# Patient Record
Sex: MALE | Race: WHITE | NOT HISPANIC OR LATINO | Employment: UNEMPLOYED | URBAN - METROPOLITAN AREA
[De-identification: names, ages, dates, MRNs, and addresses within clinical notes are randomized per-mention and may not be internally consistent; named-entity substitution may affect disease eponyms.]

---

## 2017-01-01 ENCOUNTER — APPOINTMENT (OUTPATIENT)
Dept: GENERAL RADIOLOGY | Facility: HOSPITAL | Age: 62
End: 2017-01-01

## 2017-01-01 ENCOUNTER — APPOINTMENT (OUTPATIENT)
Dept: CT IMAGING | Facility: HOSPITAL | Age: 62
End: 2017-01-01

## 2017-01-01 ENCOUNTER — HOSPITAL ENCOUNTER (EMERGENCY)
Facility: HOSPITAL | Age: 62
Discharge: HOME | End: 2017-04-22
Attending: EMERGENCY MEDICINE | Admitting: EMERGENCY MEDICINE

## 2017-01-01 VITALS
HEART RATE: 80 BPM | TEMPERATURE: 99.3 F | DIASTOLIC BLOOD PRESSURE: 87 MMHG | SYSTOLIC BLOOD PRESSURE: 119 MMHG | RESPIRATION RATE: 16 BRPM | OXYGEN SATURATION: 94 %

## 2017-01-01 DIAGNOSIS — S60.222A CONTUSION OF LEFT HAND, INITIAL ENCOUNTER: ICD-10-CM

## 2017-01-01 DIAGNOSIS — S00.532A CONTUSION OF MOUTH: ICD-10-CM

## 2017-01-01 DIAGNOSIS — S00.83XA CONTUSION OF FACE, INITIAL ENCOUNTER: ICD-10-CM

## 2017-01-01 DIAGNOSIS — S51.012A LACERATION OF LEFT ELBOW, INITIAL ENCOUNTER: ICD-10-CM

## 2017-01-01 DIAGNOSIS — S62.339A BOXER'S FRACTURE, CLOSED, INITIAL ENCOUNTER: Primary | ICD-10-CM

## 2017-01-01 DIAGNOSIS — S60.221A CONTUSION OF RIGHT HAND, INITIAL ENCOUNTER: ICD-10-CM

## 2017-01-01 PROCEDURE — 70450 CT HEAD/BRAIN W/O DYE: CPT

## 2017-01-01 PROCEDURE — 70486 CT MAXILLOFACIAL W/O DYE: CPT

## 2017-01-01 PROCEDURE — 29105 APPLICATION LONG ARM SPLINT: CPT | Performed by: EMERGENCY MEDICINE

## 2017-01-01 PROCEDURE — 99284 EMERGENCY DEPT VISIT MOD MDM: CPT

## 2017-01-01 PROCEDURE — 73130 X-RAY EXAM OF HAND: CPT

## 2017-01-01 PROCEDURE — 12032 INTMD RPR S/A/T/EXT 2.6-7.5: CPT | Performed by: EMERGENCY MEDICINE

## 2017-01-01 PROCEDURE — 99285 EMERGENCY DEPT VISIT HI MDM: CPT | Performed by: EMERGENCY MEDICINE

## 2017-01-01 PROCEDURE — 73080 X-RAY EXAM OF ELBOW: CPT

## 2017-01-01 RX ORDER — OXYCODONE HYDROCHLORIDE AND ACETAMINOPHEN 5; 325 MG/1; MG/1
1 TABLET ORAL EVERY 6 HOURS PRN
Qty: 30 TABLET | Refills: 0 | Status: SHIPPED | OUTPATIENT
Start: 2017-01-01

## 2017-01-01 RX ORDER — OXYCODONE HYDROCHLORIDE AND ACETAMINOPHEN 5; 325 MG/1; MG/1
2 TABLET ORAL ONCE
Status: COMPLETED | OUTPATIENT
Start: 2017-01-01 | End: 2017-01-01

## 2017-01-01 RX ORDER — BUPIVACAINE HYDROCHLORIDE 5 MG/ML
10 INJECTION, SOLUTION EPIDURAL; INTRACAUDAL ONCE
Status: COMPLETED | OUTPATIENT
Start: 2017-01-01 | End: 2017-01-01

## 2017-01-01 RX ORDER — LIDOCAINE HYDROCHLORIDE AND EPINEPHRINE 10; 10 MG/ML; UG/ML
10 INJECTION, SOLUTION INFILTRATION; PERINEURAL ONCE
Status: COMPLETED | OUTPATIENT
Start: 2017-01-01 | End: 2017-01-01

## 2017-01-01 RX ADMIN — OXYCODONE HYDROCHLORIDE AND ACETAMINOPHEN 2 TABLET: 5; 325 TABLET ORAL at 01:17

## 2017-01-01 RX ADMIN — BUPIVACAINE HYDROCHLORIDE 10 ML: 5 INJECTION, SOLUTION EPIDURAL; INTRACAUDAL; PERINEURAL at 01:04

## 2017-01-01 RX ADMIN — LIDOCAINE HYDROCHLORIDE AND EPINEPHRINE 10 ML: 10; 10 INJECTION, SOLUTION INFILTRATION; PERINEURAL at 01:04

## 2017-01-03 ENCOUNTER — HOSPITAL ENCOUNTER (INPATIENT)
Facility: HOSPITAL | Age: 62
LOS: 3 days | Discharge: HOME | End: 2017-01-06
Attending: EMERGENCY MEDICINE | Admitting: INTERNAL MEDICINE

## 2017-01-03 ENCOUNTER — CLINICAL SUPPORT NO REQUIREMENTS (OUTPATIENT)
Dept: CARDIOLOGY | Facility: CLINIC | Age: 62
End: 2017-01-03

## 2017-01-03 ENCOUNTER — APPOINTMENT (OUTPATIENT)
Dept: CT IMAGING | Facility: HOSPITAL | Age: 62
End: 2017-01-03

## 2017-01-03 ENCOUNTER — APPOINTMENT (OUTPATIENT)
Dept: GENERAL RADIOLOGY | Facility: HOSPITAL | Age: 62
End: 2017-01-03

## 2017-01-03 DIAGNOSIS — R07.9 CHEST PAIN IN ADULT: Primary | ICD-10-CM

## 2017-01-03 DIAGNOSIS — J18.9 PNEUMONIA OF RIGHT LOWER LOBE DUE TO INFECTIOUS ORGANISM: ICD-10-CM

## 2017-01-03 DIAGNOSIS — I25.5 ISCHEMIC CARDIOMYOPATHY: Primary | ICD-10-CM

## 2017-01-03 PROBLEM — R07.89 ATYPICAL CHEST PAIN: Status: ACTIVE | Noted: 2017-01-03

## 2017-01-03 LAB
ALBUMIN SERPL-MCNC: 3.6 G/DL (ref 3.5–5.2)
ALBUMIN/GLOB SERPL: 1.1 G/DL
ALP SERPL-CCNC: 90 U/L (ref 40–129)
ALT SERPL W P-5'-P-CCNC: 16 U/L (ref 5–41)
ANION GAP SERPL CALCULATED.3IONS-SCNC: 16.6 MMOL/L
AST SERPL-CCNC: 15 U/L (ref 5–40)
B PERT DNA SPEC QL NAA+PROBE: NOT DETECTED
BASOPHILS # BLD AUTO: 0.11 10*3/MM3 (ref 0–0.2)
BASOPHILS NFR BLD AUTO: 1.1 % (ref 0–2)
BILIRUB SERPL-MCNC: 0.7 MG/DL (ref 0.2–1.2)
BUN BLD-MCNC: 17 MG/DL (ref 8–23)
BUN/CREAT SERPL: 14.3 (ref 7–25)
C PNEUM DNA NPH QL NAA+NON-PROBE: NOT DETECTED
CALCIUM SPEC-SCNC: 9.2 MG/DL (ref 8.8–10.5)
CHLORIDE SERPL-SCNC: 98 MMOL/L (ref 98–107)
CO2 SERPL-SCNC: 21.4 MMOL/L (ref 22–29)
CREAT BLD-MCNC: 1.19 MG/DL (ref 0.76–1.27)
D DIMER PPP FEU-MCNC: 0.47 MCGFEU/ML (ref 0–0.46)
D-LACTATE SERPL-SCNC: 1 MMOL/L (ref 0.5–2)
DEPRECATED RDW RBC AUTO: 49.4 FL (ref 37–54)
EOSINOPHIL # BLD AUTO: 0.24 10*3/MM3 (ref 0.1–0.3)
EOSINOPHIL NFR BLD AUTO: 2.3 % (ref 0–4)
ERYTHROCYTE [DISTWIDTH] IN BLOOD BY AUTOMATED COUNT: 15.4 % (ref 11.5–14.5)
FLUAV H1 2009 PAND RNA NPH QL NAA+PROBE: NOT DETECTED
FLUAV H1 HA GENE NPH QL NAA+PROBE: NOT DETECTED
FLUAV H3 RNA NPH QL NAA+PROBE: NOT DETECTED
FLUAV SUBTYP SPEC NAA+PROBE: NOT DETECTED
FLUBV RNA ISLT QL NAA+PROBE: NOT DETECTED
GFR SERPL CREATININE-BSD FRML MDRD: 62 ML/MIN/1.73
GLOBULIN UR ELPH-MCNC: 3.3 GM/DL
GLUCOSE BLD-MCNC: 99 MG/DL (ref 65–99)
HADV DNA SPEC NAA+PROBE: NOT DETECTED
HCOV 229E RNA SPEC QL NAA+PROBE: NOT DETECTED
HCOV HKU1 RNA SPEC QL NAA+PROBE: NOT DETECTED
HCOV NL63 RNA SPEC QL NAA+PROBE: NOT DETECTED
HCOV OC43 RNA SPEC QL NAA+PROBE: NOT DETECTED
HCT VFR BLD AUTO: 40 % (ref 42–52)
HGB BLD-MCNC: 13.2 G/DL (ref 14–18)
HMPV RNA NPH QL NAA+NON-PROBE: NOT DETECTED
HPIV1 RNA SPEC QL NAA+PROBE: NOT DETECTED
HPIV2 RNA SPEC QL NAA+PROBE: NOT DETECTED
HPIV3 RNA NPH QL NAA+PROBE: NOT DETECTED
HPIV4 P GENE NPH QL NAA+PROBE: NOT DETECTED
IMM GRANULOCYTES # BLD: 0.03 10*3/MM3 (ref 0–0.03)
IMM GRANULOCYTES NFR BLD: 0.3 % (ref 0–0.5)
INR PPP: 1.12 (ref 0.9–1.1)
LYMPHOCYTES # BLD AUTO: 1.16 10*3/MM3 (ref 0.6–4.8)
LYMPHOCYTES NFR BLD AUTO: 11.3 % (ref 20–45)
M PNEUMO IGG SER IA-ACNC: NOT DETECTED
MCH RBC QN AUTO: 29 PG (ref 27–31)
MCHC RBC AUTO-ENTMCNC: 33 G/DL (ref 31–37)
MCV RBC AUTO: 87.9 FL (ref 80–94)
MONOCYTES # BLD AUTO: 0.66 10*3/MM3 (ref 0–1)
MONOCYTES NFR BLD AUTO: 6.4 % (ref 3–8)
NEUTROPHILS # BLD AUTO: 8.11 10*3/MM3 (ref 1.5–8.3)
NEUTROPHILS NFR BLD AUTO: 78.6 % (ref 45–70)
NRBC BLD MANUAL-RTO: 0 /100 WBC (ref 0–0)
PLATELET # BLD AUTO: 234 10*3/MM3 (ref 140–500)
PMV BLD AUTO: 11.5 FL (ref 7.4–10.4)
POTASSIUM BLD-SCNC: 4 MMOL/L (ref 3.5–5.2)
PROT SERPL-MCNC: 6.9 G/DL (ref 6–8.5)
PROTHROMBIN TIME: 14.1 SECONDS (ref 12.1–15)
RBC # BLD AUTO: 4.55 10*6/MM3 (ref 4.7–6.1)
RHINOVIRUS RNA SPEC NAA+PROBE: NOT DETECTED
RSV RNA NPH QL NAA+NON-PROBE: NOT DETECTED
SODIUM BLD-SCNC: 136 MMOL/L (ref 136–145)
TROPONIN T SERPL-MCNC: 0.01 NG/ML (ref 0–0.03)
TROPONIN T SERPL-MCNC: <0.01 NG/ML (ref 0–0.03)
TROPONIN T SERPL-MCNC: <0.01 NG/ML (ref 0–0.03)
WBC NRBC COR # BLD: 10.31 10*3/MM3 (ref 4.8–10.8)

## 2017-01-03 PROCEDURE — 25010000002 CEFTRIAXONE PER 250 MG: Performed by: EMERGENCY MEDICINE

## 2017-01-03 PROCEDURE — 85610 PROTHROMBIN TIME: CPT | Performed by: EMERGENCY MEDICINE

## 2017-01-03 PROCEDURE — 87486 CHLMYD PNEUM DNA AMP PROBE: CPT | Performed by: INTERNAL MEDICINE

## 2017-01-03 PROCEDURE — 99222 1ST HOSP IP/OBS MODERATE 55: CPT | Performed by: INTERNAL MEDICINE

## 2017-01-03 PROCEDURE — 87633 RESP VIRUS 12-25 TARGETS: CPT | Performed by: INTERNAL MEDICINE

## 2017-01-03 PROCEDURE — 25010000002 AZITHROMYCIN: Performed by: EMERGENCY MEDICINE

## 2017-01-03 PROCEDURE — 87581 M.PNEUMON DNA AMP PROBE: CPT | Performed by: INTERNAL MEDICINE

## 2017-01-03 PROCEDURE — 85025 COMPLETE CBC W/AUTO DIFF WBC: CPT | Performed by: EMERGENCY MEDICINE

## 2017-01-03 PROCEDURE — 85379 FIBRIN DEGRADATION QUANT: CPT | Performed by: EMERGENCY MEDICINE

## 2017-01-03 PROCEDURE — 99284 EMERGENCY DEPT VISIT MOD MDM: CPT

## 2017-01-03 PROCEDURE — 71250 CT THORAX DX C-: CPT

## 2017-01-03 PROCEDURE — 71020 HC CHEST PA AND LATERAL: CPT

## 2017-01-03 PROCEDURE — 93282 PRGRMG EVAL IMPLANTABLE DFB: CPT | Performed by: INTERNAL MEDICINE

## 2017-01-03 PROCEDURE — 80053 COMPREHEN METABOLIC PANEL: CPT | Performed by: EMERGENCY MEDICINE

## 2017-01-03 PROCEDURE — 94799 UNLISTED PULMONARY SVC/PX: CPT

## 2017-01-03 PROCEDURE — 25010000002 ENOXAPARIN PER 10 MG: Performed by: INTERNAL MEDICINE

## 2017-01-03 PROCEDURE — 87205 SMEAR GRAM STAIN: CPT | Performed by: INTERNAL MEDICINE

## 2017-01-03 PROCEDURE — 25010000002 KETOROLAC TROMETHAMINE PER 15 MG: Performed by: EMERGENCY MEDICINE

## 2017-01-03 PROCEDURE — 84484 ASSAY OF TROPONIN QUANT: CPT | Performed by: EMERGENCY MEDICINE

## 2017-01-03 PROCEDURE — 87798 DETECT AGENT NOS DNA AMP: CPT | Performed by: INTERNAL MEDICINE

## 2017-01-03 PROCEDURE — 99285 EMERGENCY DEPT VISIT HI MDM: CPT | Performed by: EMERGENCY MEDICINE

## 2017-01-03 PROCEDURE — 94640 AIRWAY INHALATION TREATMENT: CPT

## 2017-01-03 PROCEDURE — 84484 ASSAY OF TROPONIN QUANT: CPT | Performed by: INTERNAL MEDICINE

## 2017-01-03 PROCEDURE — 87070 CULTURE OTHR SPECIMN AEROBIC: CPT | Performed by: INTERNAL MEDICINE

## 2017-01-03 PROCEDURE — 83605 ASSAY OF LACTIC ACID: CPT | Performed by: INTERNAL MEDICINE

## 2017-01-03 PROCEDURE — 93010 ELECTROCARDIOGRAM REPORT: CPT | Performed by: INTERNAL MEDICINE

## 2017-01-03 PROCEDURE — 93290 INTERROG DEV EVAL ICPMS IP: CPT | Performed by: INTERNAL MEDICINE

## 2017-01-03 PROCEDURE — 87040 BLOOD CULTURE FOR BACTERIA: CPT | Performed by: EMERGENCY MEDICINE

## 2017-01-03 PROCEDURE — 93005 ELECTROCARDIOGRAM TRACING: CPT | Performed by: EMERGENCY MEDICINE

## 2017-01-03 RX ORDER — ACETAMINOPHEN 325 MG/1
650 TABLET ORAL EVERY 6 HOURS PRN
Status: DISCONTINUED | OUTPATIENT
Start: 2017-01-03 | End: 2017-01-06 | Stop reason: HOSPADM

## 2017-01-03 RX ORDER — FUROSEMIDE 40 MG/1
40 TABLET ORAL DAILY
COMMUNITY

## 2017-01-03 RX ORDER — ALBUTEROL SULFATE 90 UG/1
2 AEROSOL, METERED RESPIRATORY (INHALATION) EVERY 4 HOURS PRN
COMMUNITY

## 2017-01-03 RX ORDER — SIMVASTATIN 40 MG
40 TABLET ORAL NIGHTLY
COMMUNITY

## 2017-01-03 RX ORDER — ACETAMINOPHEN 325 MG/1
650 TABLET ORAL EVERY 6 HOURS PRN
COMMUNITY

## 2017-01-03 RX ORDER — CLOPIDOGREL BISULFATE 75 MG/1
75 TABLET ORAL DAILY
COMMUNITY

## 2017-01-03 RX ORDER — IPRATROPIUM BROMIDE AND ALBUTEROL SULFATE 2.5; .5 MG/3ML; MG/3ML
3 SOLUTION RESPIRATORY (INHALATION)
Status: DISCONTINUED | OUTPATIENT
Start: 2017-01-03 | End: 2017-01-06 | Stop reason: HOSPADM

## 2017-01-03 RX ORDER — SODIUM CHLORIDE 0.9 % (FLUSH) 0.9 %
10 SYRINGE (ML) INJECTION AS NEEDED
Status: DISCONTINUED | OUTPATIENT
Start: 2017-01-03 | End: 2017-01-06 | Stop reason: HOSPADM

## 2017-01-03 RX ORDER — ASPIRIN 81 MG/1
81 TABLET ORAL DAILY
Status: DISCONTINUED | OUTPATIENT
Start: 2017-01-03 | End: 2017-01-06 | Stop reason: HOSPADM

## 2017-01-03 RX ORDER — ATORVASTATIN CALCIUM 20 MG/1
20 TABLET, FILM COATED ORAL NIGHTLY
Status: DISCONTINUED | OUTPATIENT
Start: 2017-01-03 | End: 2017-01-06 | Stop reason: HOSPADM

## 2017-01-03 RX ORDER — DOCUSATE SODIUM 100 MG/1
100 CAPSULE, LIQUID FILLED ORAL 2 TIMES DAILY
Status: DISCONTINUED | OUTPATIENT
Start: 2017-01-03 | End: 2017-01-06 | Stop reason: HOSPADM

## 2017-01-03 RX ORDER — ASPIRIN 81 MG/1
81 TABLET ORAL DAILY
COMMUNITY

## 2017-01-03 RX ORDER — CLOPIDOGREL BISULFATE 75 MG/1
75 TABLET ORAL DAILY
Status: DISCONTINUED | OUTPATIENT
Start: 2017-01-03 | End: 2017-01-06 | Stop reason: HOSPADM

## 2017-01-03 RX ORDER — NITROGLYCERIN 0.4 MG/1
0.4 TABLET SUBLINGUAL
Status: DISCONTINUED | OUTPATIENT
Start: 2017-01-03 | End: 2017-01-06 | Stop reason: HOSPADM

## 2017-01-03 RX ORDER — KETOROLAC TROMETHAMINE 30 MG/ML
30 INJECTION, SOLUTION INTRAMUSCULAR; INTRAVENOUS ONCE
Status: COMPLETED | OUTPATIENT
Start: 2017-01-03 | End: 2017-01-03

## 2017-01-03 RX ORDER — NITROGLYCERIN 0.4 MG/1
0.4 TABLET SUBLINGUAL
COMMUNITY

## 2017-01-03 RX ORDER — SODIUM CHLORIDE 9 MG/ML
125 INJECTION, SOLUTION INTRAVENOUS CONTINUOUS
Status: DISCONTINUED | OUTPATIENT
Start: 2017-01-03 | End: 2017-01-05

## 2017-01-03 RX ADMIN — SODIUM CHLORIDE 1000 ML: 9 INJECTION, SOLUTION INTRAVENOUS at 07:40

## 2017-01-03 RX ADMIN — SODIUM CHLORIDE 125 ML/HR: 9 INJECTION, SOLUTION INTRAVENOUS at 10:20

## 2017-01-03 RX ADMIN — IPRATROPIUM BROMIDE AND ALBUTEROL SULFATE 3 ML: .5; 3 SOLUTION RESPIRATORY (INHALATION) at 20:36

## 2017-01-03 RX ADMIN — ASPIRIN 81 MG: 81 TABLET, COATED ORAL at 12:28

## 2017-01-03 RX ADMIN — CLOPIDOGREL BISULFATE 75 MG: 75 TABLET, FILM COATED ORAL at 10:30

## 2017-01-03 RX ADMIN — ACETAMINOPHEN 650 MG: 325 TABLET, FILM COATED ORAL at 21:34

## 2017-01-03 RX ADMIN — ENOXAPARIN SODIUM 40 MG: 40 INJECTION SUBCUTANEOUS at 20:50

## 2017-01-03 RX ADMIN — KETOROLAC TROMETHAMINE 30 MG: 30 INJECTION, SOLUTION INTRAMUSCULAR at 00:33

## 2017-01-03 RX ADMIN — IPRATROPIUM BROMIDE AND ALBUTEROL SULFATE 3 ML: .5; 3 SOLUTION RESPIRATORY (INHALATION) at 12:37

## 2017-01-03 RX ADMIN — AZITHROMYCIN 500 MG: 500 INJECTION, POWDER, LYOPHILIZED, FOR SOLUTION INTRAVENOUS at 06:15

## 2017-01-03 RX ADMIN — CEFTRIAXONE 1 G: 1 INJECTION, SOLUTION INTRAVENOUS at 05:32

## 2017-01-03 RX ADMIN — SODIUM CHLORIDE 125 ML/HR: 9 INJECTION, SOLUTION INTRAVENOUS at 18:31

## 2017-01-03 RX ADMIN — ATORVASTATIN CALCIUM 20 MG: 20 TABLET, FILM COATED ORAL at 20:49

## 2017-01-03 RX ADMIN — IPRATROPIUM BROMIDE AND ALBUTEROL SULFATE 3 ML: .5; 3 SOLUTION RESPIRATORY (INHALATION) at 09:09

## 2017-01-03 RX ADMIN — IPRATROPIUM BROMIDE AND ALBUTEROL SULFATE 3 ML: .5; 3 SOLUTION RESPIRATORY (INHALATION) at 16:32

## 2017-01-03 NOTE — H&P
HOSPITALIST SERVICES       @ [place], [place]                Hospitalist Team    ADMISSION HISTORY AND PHYSICAL    PATIENT:      Patient Care Team:  No Known Provider as PCP - General    CHIEF COMPLAINT:     Lt sided chest pain since yesterday, not associated with ambulation or physical activity    HISTORY OF PRESENT ILLNESS:    Mr. Mario Nicholson is a 61 year old  male who is known to have HTN, Hyperlipidemia, CAD/MI, S/P CABG and Status quo cardiac defibrillator, who presented to ED via ambulance for evaluation of new onset chest pains. He has a hx of CAD with an MI in 2016 that required stenting and subsequent defibrillator implantation. These procedures were done in [place], [place]. Unfortunately, pt was placed under arrest about one month ago and was recently transferred to a local senior living in our area ([facility]). He has been taking his usual daily heart-related medications throughout his imprisonment, however he says that prior to his transfer to this new senior living, he was started briefly on someone unknown antibiotic for a pneumonia but never got to finish that prescription here. Last night, as he was lying in his bunk, he suddenly developed some new left sided chest pains that began to worry him. He says the pain is worse with certain positions and is also worse with deep breaths. He has had some moderate productive cough with yellow sputum for several weeks. He also says that he does not smoke now but smoked for several years before recently quitting. He denies any recent fevers. He denies any hx of abdominal pain, nausea, vomiting, dizziness, or sweating. He denies any sx of dysuria or recent hx of blood in stool    Thorough analysis of the patient’s complaint using the FAR COLDER system:   Frequency Yesterday evening  Associated symptoms NONE  Radiations NONE   Character stabbing pain   Onset Yesterday evening   Location Left chest  Duration for several minutes  Exacerbating factors  Movement and deep breathing  Relieving factors Rest      HEART Score  History: Moderately suspicious (+1)  ECG: Normal (+0)  Age: Greater than or equal to 65 (+2)  Risk Factors: 3 or more risk factors OR history of atherosclerotic disease (+2)  Troponin: Normal limit or lower (+0)  Total: 5                  Past Medical History   Diagnosis Date   • COPD (chronic obstructive pulmonary disease)    • Coronary artery disease    • Hyperlipidemia    • Hypertension      Past Surgical History   Procedure Laterality Date   • Coronary artery bypass graft     • Cardiac defibrillator placement       History reviewed. No pertinent family history.  Social History   Substance Use Topics   • Smoking status: Former Smoker     Packs/day: 2.00     Years: 55.00   • Smokeless tobacco: None   • Alcohol use No     Prescriptions Prior to Admission   Medication Sig Dispense Refill Last Dose   • acetaminophen (TYLENOL) 325 MG tablet Take 650 mg by mouth Every 6 (Six) Hours As Needed for mild pain (1-3).      • nitroglycerin (NITROSTAT) 0.4 MG SL tablet Place 0.4 mg under the tongue Every 5 (Five) Minutes As Needed for chest pain. Take no more than 3 doses in 15 minutes.      • albuterol (PROVENTIL HFA;VENTOLIN HFA) 108 (90 BASE) MCG/ACT inhaler Inhale 2 puffs Every 4 (Four) Hours As Needed for wheezing.      • aspirin 81 MG EC tablet Take 81 mg by mouth Daily.      • clopidogrel (PLAVIX) 75 MG tablet Take 75 mg by mouth Daily.      • furosemide (LASIX) 40 MG tablet Take 40 mg by mouth Daily.      • simvastatin (ZOCOR) 40 MG tablet Take 40 mg by mouth Every Night.        Allergies:  Contrast dye    REVIEW OF SYSTEMS:  Please see the above history of present illness for pertinent positives and negatives.  The remainder of the patient's systems have been reviewed and are negative.     Vital Signs  Temp:  [97 °F (36.1 °C)-97.6 °F (36.4 °C)] 97 °F (36.1 °C)  Heart Rate:  [65-86] 79  Resp:  [20] 20  BP: ()/(49-89) 106/73    Flowsheet Rows   "       First Filed Value    Admission Height  71\" (180.3 cm) Documented at 01/03/2017 0059    Admission Weight  177 lb (80.3 kg) Documented at 01/03/2017 0059           Physical Exam:    PHYSICAL EXAMINATION:    VITAL SIGNS: As per Nurse's notes    GENERAL APPEARANCE: The patient is a well developed, well nourished,  male, in no acute distress without complaints of shortness of breath, dyspnea or acute pain. Lips and nail beds are pink.    HEENT: Normocephalic, atraumatic. PERRL. The sclerae anicteric and conjunctivae pink and moist.  No rhinitis. Lips, teeth, and gums showed normal mucosa. The oral mucosa, hard and soft palate, tongue and posterior pharynx were normal.     NECK: Supple and symmetric. No masses. No thyromegaly. No tenderness. Trachea central. No carotid bruits. No evidence of JVD.    LYMPH NODES: No palpable lymphadenopathy.    SKIN: Warm, dry and intact. No rash or lesions or wounds or patechiae.    CHEST: Normal AP diameter and normal contour without any kyphoscoliosis. Patient has pain in left rib cage laterally on palpation.    LUNGS: Accessory muscles of respiration are not active. Clear to auscultation bilaterally. Respiratory expansion equal bilaterally. No wheezes/rales/crackles/rubs.    CARDIOVASCULAR: RRR. S1, S2 normal without murmur/gallop/rub. No S3, S4. The carotid pulses were normal and 2+ bilaterally without bruits. Peripheral pulses were 2+ and symmetric. Capillary refill less than 3 seconds.    ABDOMEN: Soft, non-tender, non-distended. No masses. No rebound/guarding. No hepatosplenomegaly.     RECTAL: Not done for today's visit.     EXTREMITIES:  No evidence of cyanosis, clubbing, or edema. No rash or lesions. + pedal pulses. No ulcers or varicosities identified. Pulses are 2+ throughout. No evidence of Pallor, Pulselessness, Poikilothermia (\"coldness\"), Paralysis or Paresthesia. Moves all extremities well. Negative Homans sign bilaterally.     MUSCULOSKELETAL: Examination " of cervical and lumbosacral spines unremarkable. ROM of extremities WNL. No evidence of redness, swelling, warmth or pain of the joints of the extremities. Muscle strength and tone normal.    PSYCHIATRY: Responds appropriately to questions. No evidence of acute anxiety, depression, panic attacks or hallucinations.    NEUROLOGIC: Examination is grossly intact globally with no focal deficits. Cranial nerves II through XII are grossly intact. No motor weakness. No decrease in sensation. No facial asymmetry. Gait not tested.             Results Review:    I reviewed the patient's new clinical results.  Lab Results (most recent)     Procedure Component Value Units Date/Time    CBC & Differential [[id number]] Collected:  01/03/17 0029    Specimen:  Blood Updated:  01/03/17 0042    Narrative:       The following orders were created for panel order CBC & Differential.  Procedure                               Abnormality         Status                     ---------                               -----------         ------                     CBC Auto Differential[[id number]]         Abnormal            Final result                 Please view results for these tests on the individual orders.    CBC Auto Differential [[id number]]  (Abnormal) Collected:  01/03/17 0029    Specimen:  Blood Updated:  01/03/17 0042     WBC 10.31 10*3/mm3      RBC 4.55 (L) 10*6/mm3      Hemoglobin 13.2 (L) g/dL      Hematocrit 40.0 (L) %      MCV 87.9 fL      MCH 29.0 pg      MCHC 33.0 g/dL      RDW 15.4 (H) %      RDW-SD 49.4 fl      MPV 11.5 (H) fL      Platelets 234 10*3/mm3      Neutrophil % 78.6 (H) %      Lymphocyte % 11.3 (L) %      Monocyte % 6.4 %      Eosinophil % 2.3 %      Basophil % 1.1 %      Immature Grans % 0.3 %      Neutrophils, Absolute 8.11 10*3/mm3      Lymphocytes, Absolute 1.16 10*3/mm3      Monocytes, Absolute 0.66 10*3/mm3      Eosinophils, Absolute 0.24 10*3/mm3      Basophils, Absolute 0.11 10*3/mm3      Immature Grans, Absolute  0.03 10*3/mm3      nRBC 0.0 /100 WBC     Protime-INR [[id number]]  (Abnormal) Collected:  01/03/17 0029    Specimen:  Blood Updated:  01/03/17 0058     Protime 14.1 Seconds      INR 1.12 (H)     Narrative:       Therapeutic Ranges for INR: 2.0-3.0 (PT 20-30)                              2.5-3.5 (PT 25-34)    Troponin [[id number]]  (Normal) Collected:  01/03/17 0029    Specimen:  Blood Updated:  01/03/17 0106     Troponin T 0.012 ng/mL     Narrative:       Troponin T Reference Ranges:  Less than 0.03 ng/mL:    Negative for AMI  0.03 to 0.09 ng/mL:      Indeterminant for AMI  Greater than 0.09 ng/mL: Positive for AMI    Comprehensive Metabolic Panel [[id number]]  (Abnormal) Collected:  01/03/17 0029    Specimen:  Blood Updated:  01/03/17 0112     Glucose 99 mg/dL      BUN 17 mg/dL      Creatinine 1.19 mg/dL      Sodium 136 mmol/L      Potassium 4.0 mmol/L      Chloride 98 mmol/L      CO2 21.4 (L) mmol/L      Calcium 9.2 mg/dL      Total Protein 6.9 g/dL      Albumin 3.60 g/dL      ALT (SGPT) 16 U/L      AST (SGOT) 15 U/L      Alkaline Phosphatase 90 U/L      Total Bilirubin 0.7 mg/dL      eGFR Non African Amer 62 mL/min/1.73      Globulin 3.3 gm/dL      A/G Ratio 1.1 g/dL      BUN/Creatinine Ratio 14.3      Anion Gap 16.6 mmol/L     Blood Culture [[id number]] Collected:  01/03/17 0533    Specimen:  Blood from Arm, Right Updated:  01/03/17 0534    Blood Culture [[id number]] Collected:  01/03/17 0533    Specimen:  Blood from Arm, Left Updated:  01/03/17 0534    D-dimer, Quantitative [[id number]]  (Abnormal) Collected:  01/03/17 0030    Specimen:  Blood Updated:  01/03/17 0549     D-Dimer, Quantitative 0.47 (H) MCGFEU/mL     Narrative:       Can be elevated in, but is not diagnostic for deep vein thrombosis (DVT) or pulmonary embolis (PE).  It is also elevated in other medical conditions.  Clinical correlation is required.  The negative cut-off value for the D-Dimer is 0.50 mcg FEU/mL for DVT and PE.    Troponin [[id number]]  (Normal)  Collected:  01/03/17 0533    Specimen:  Blood Updated:  01/03/17 0610     Troponin T <0.010 ng/mL     Narrative:       Troponin T Reference Ranges:  Less than 0.03 ng/mL:    Negative for AMI  0.03 to 0.09 ng/mL:      Indeterminant for AMI  Greater than 0.09 ng/mL: Positive for AMI    Lactic Acid, Plasma [[id number]]  (Normal) Collected:  01/03/17 0820    Specimen:  Blood Updated:  01/03/17 0850     Lactate 1.0 mmol/L           Imaging Results (most recent)     Procedure Component Value Units Date/Time    CT Chest Without Contrast [[id number]] Collected:  01/03/17 0811     Updated:  01/03/17 0832    Narrative:       CT CHEST WITHOUT CONTRAST 01/03/2017 AT 0324 HOURS     HISTORY: 61-year-old male with complaints of left-sided chest pain  tonight worse with deep inspiration. Productive cough for 2 days.  Emphysema. Previous myocardial infarction.     COMPARISON: PA and lateral chest radiograph 01/03/2017 at 0050 hours.     PROCEDURE: 5 mm noncontrast axial images through the chest. Sagittal and  coronal reformatted images were obtained.     FINDINGS: Advanced emphysematous changes are present predominantly in  the lung apices.     Dense peripheral airspace disease with air bronchograms is present in  the right lower lobe anteriorly and laterally abutting the major fissure  and lateral pleural margin. The area of involvement measures roughly 4.9  x 4.4 cm. More patchy airspace disease is seen peripherally within the  right lower lobe posterolaterally. Lesser degree of irregular infiltrate  is present in the central right upper lobe. Noncalcified nodule within  the left lower lobe (series 3, image 41) measures 5 mm. Minimal  posterior left basilar atelectasis. Trace right pleural effusion. Mild  cardiomegaly. Stent in the left anterior descending coronary artery. No  pericardial effusion.     There are enlarged mediastinal lymph nodes. There is a dominant  precarinal lymph node containing coarse eccentric  calcification  measuring up to 1.8 cm short axis. Another coarsely calcified subcarinal  node measures up to 2 cm short axis. Other noncalcified nodes are  present in the upper mediastinum including a right paratracheal node  measuring 1.3 cm. Findings are nonspecific and could represent changes  of calcified and noncalcified granulomatous disease. Follow-up  recommended or correlation with outside imaging studies recommended to  document stability, however.     Left chest wall pacemaker in place.     No pneumothorax.     Included portions of the upper abdominal organs are within normal  limits. Small amount of excreted contrast material is seen within the  renal collecting systems. Tiny gallstones incidentally noted.  Degenerative endplate changes are present within the thoracic spine. No  acute osseous abnormalities are identified.       Impression:       1. Dense peripheral airspace disease in the right lower lobe. More  patchy alveolar disease changes are present within the right lower lobe,  right upper lobe, and to a lesser degree left lower lobe. Findings are  favored to represent changes of pneumonia, given the appropriate  clinical context. Other etiologies such as pulmonary infarct not  excluded but thought less likely.     2. Enlarged mediastinal lymph nodes as described above. Both benign and  malignant etiologies in the differential. Correlation with outside  imaging studies recommended if available. Alternatively, short-term CT  chest follow-up within 6 months is recommended.     3. 5 mm noncalcified left lower lobe pulmonary nodule. This, too, can be  monitored at follow-up or at time of comparison with outside studies.     4. Moderately advanced emphysema.      5. Cardiomegaly with coronary artery stent placement.      6. Uncomplicated cholelithiasis.     7. Trace right pleural effusion.     8. Preliminary report was provided by Dr. Becerra on 01/03/2017 at 0402  hours.      This report was finalized  on 1/3/2017 8:30 AM by Dr. Elizabeth Beckford MD.       XR Chest 2 View [[id number]] Collected:  01/03/17 0814     Updated:  01/03/17 0832    Narrative:       PA AND LATERAL CHEST 01/03/2017 AT 0050 HOURS     HISTORY: Left side chest pain, shortness of breath, dizziness started  tonight. Previous history of myocardial infarct.     COMPARISON: None.     FINDINGS: Airspace disease is seen within the right lower lobe with  small right pleural effusion. No definite left lung infiltrate is  identified. Mild cardiomegaly. Left chest wall pacemaker placed with it  extending to the region of the right ventricle. No visible pneumothorax.       Impression:          1. Dense peripheral airspace disease within the right lower lobe with  small right pleural effusion.     2. Mild cardiomegaly.      This report was finalized on 1/3/2017 8:30 AM by Dr. Elizabeth Beckford MD.           reviewed    ECG/EMG Results (most recent)     Procedure Component Value Units Date/Time    ECG 12 Lead [[id number]] Collected:  01/03/17 0013     Updated:  01/03/17 0309        reviewed    Assessment/Plan       DIFFERENTIAL DIAGNOSES CONSIDERED FOR CHIEF COMPLAINT:        The following clinical entities were considered in differential diagnosis. The list includes commonly encountered practical probabilities and rare esoteric diagnoses worked out through systemic diagnostic methods used to identify the presence of a disease entity where multiple diagnoses are possible. The decision is reached through either of the following methods: 1) direct confirmatory testing, or 2) a process of elimination, or 3) at least a process of obtaining information that shrinks the “probabilities” of candidate conditions to negligible levels, by using clinical evidence and thus implementing aspects of the hypothetico-deductive method.      DIFFERENTIAL DIAGNOSIS OF CHEST PAIN:  1) Acute Myocardial Infarction, 2) Unstable angina, 3) Chest wall pain, 4) Gastroesophageal reflux disease  (GERD), 5) Panic disorder/ Anxiety state, 6) Pericarditis, 7) Pneumonia, 8) Congestive Heart Failure, 9) Pulmonary embolism, 10) Acute thoracic aortic dissection          ASSESSMENT AND PLAN:       SUMMARY:    ?   PROPHYLAXIS:   -Oxygen Saturation: As per Nurses' notes.   -DVT Prophylaxis: On Inj. Lovenox   -Gastritis Prophylaxis: Omeprazole 20 mg PO qAM   -Constipation Prophylaxis: colace 100 mg po BID   -Immunizations: N/A  -Intake and Output Orders: As per order sheet   -Davis Catheter: Not indicated at this time  -Smoking/ Nicotine issues: Nicotine patch 21 mg daily.     PAIN MANAGEMENT:  -Pain Management: as per order sheet   -Miscellaneous Medications: Tylenol 650 mg 1 po q4-6 hours for headache or   temp >100 degrees     ACTIVITIES:  -Bathroom Privileges: May use bathroom   -Activity: Encouraged to sit up in side chair for 2-4 hours BID   -PT/OT: Physical THerapy consult has been requested for evaluation and ROM exercises     NUTRITION AND FLUIDS:  -Diet/ Nutrition: 2 gm Na, low cholestreol, consistent carbohydrate diet   -Fluid Status/Electrolytes: D5 1/2NS 1 L with 20 mEq KCl 125 mL/Hr      SOCIAL ISSUES:   -MRSA SCREEN: As per institutional protocol   -Behavioral/ Agitation Issues: NONE   -Social Issues: Lives at home with his family.   -Occupational Issues: Patient is Retired/ Unemployed at this time.   -Code Status: DNR on admission   -Disposition: TBD     THERAPEUTIC:   ALLERGIES: as per admission H&P   ANTIBIOTICS: as per order sheet   INSULIN THERAPY: Low dose insulin coverage as ordered   CHEST PAIN: NTG 0.4 MG s/l at onset of chest pain; Repeat q5 min x 2 PRN   NEBULIZER TREATMENT: DuoNeb 3 ml via nebulizer q4-6 hrs PRN for shortness of   breath and/or wheezing   ANXIETY: Xanax 0.5 mg po BID for anxiety   DEPRESSION: To continue patient's outpatient medical regimen   INSOMNIA: Ambien 5 mg 1 po qHS PRN for insomnia             PRIMARY DIAGNOSES:    1) Lt sided chest pain: Patient is on serial  Troponins and will order Echocardiogram and EKG in AM    2) RLL Pneumonia: Patient is on IV Rocephin and IV Zithromax (As per radiologist in D/D consideration is for pulmonary infarct but it is less likely)    3) 5 mm noncalcified left lower lobe pulmonary nodule with Enlarged mediastinal lymph nodes: I will try to find out old CXR reparts if available. Will request Pulmonary consult for need for biopsy     4) COPD: Hx noted. Patient is on DuoNeb    5) CAD: Hx noted    6) HTN: On Furosemide but BP was low this AM and patient is on seline infusion and last BP is 106/73    7) Hyperlipidemia: On Simvastatin    8) Former smoker with hx of 2ppd x 55 years    9) Status quo cardiac defibrillator  ?     SECONDARY DIAGNOSES:  ?     As above        SURGICAL DIAGNOSES:      S/P Cardiac defibrillator, S/P CABG                PLAN:    Labs and diagnostic tests reviewed: Trop <0.010, D-Dimer 0.47, PT 14.1, INR 1.12, WBC 10.31, Hb 13.2, Plt 234, 78.6N, 11.3L, Gluc 99, Na 136, K 4.0, AG 16.6, Creat 1.19    Diagnostic tests reviewed:     As per Dr Orozco:  EKG with no acute changes  CXR with RLL pneumonia  IMPRESSION:      1. Dense peripheral airspace disease within the right lower lobe with  small right pleural effusion.      2. Mild cardiomegaly.        This report was finalized on 1/3/2017 8:30 AM by Dr. Elizabeth Beckford MD.        IMPRESSION:  1. Dense peripheral airspace disease in the right lower lobe. More  patchy alveolar disease changes are present within the right lower lobe,  right upper lobe, and to a lesser degree left lower lobe. Findings are  favored to represent changes of pneumonia, given the appropriate  clinical context. Other etiologies such as pulmonary infarct not  excluded but thought less likely.      2. Enlarged mediastinal lymph nodes as described above. Both benign and  malignant etiologies in the differential. Correlation with outside  imaging studies recommended if available. Alternatively, short-term  CT  chest follow-up within 6 months is recommended.      3. 5 mm noncalcified left lower lobe pulmonary nodule. This, too, can be  monitored at follow-up or at time of comparison with outside studies.      4. Moderately advanced emphysema.        5. Cardiomegaly with coronary artery stent placement.        6. Uncomplicated cholelithiasis.      7. Trace right pleural effusion.      8. Preliminary report was provided by Dr. Becerra on 01/03/2017 at 0402  hours.        This report was finalized on 1/3/2017 8:30 AM by Dr. Elizabeth Beckford MD.        Patient is clinically and hemodynamically stable    Will request cardiology consult    Any new recommendations: As per Cardiology    New Labs ordered: Serial Troponins; Repeat CBC and CMP in AM    New diagnostic tests ordered: Echocardiogram and EKG in AM    Any changes in medications: N/A    To continue current management and supportive care    Pain management issues: NTG S/L    Discharge planning issues: Patient will go back to [facility] when ready for discharge    Will follow patient closely    Nothing new to add for right now          PLAN NARRATIVE:              I discussed the patients findings and my recommendations with patient and patient is agreeable to current treatment and management plan.     Jamin Nixon MD  01/03/17  9:42 AM          Jamin Nioxn M.D., FACP  Internal Medicine/ Hospitalist        Time:       EMR Dragon/Transcription disclaimer:      Much of this encounter note is an electronic transcription/translation of spoken language to printed text. The electronic translation of spoken language may permit erroneous, or at times, nonsensical words or phrases to be inadvertently transcribed; Although I have reviewed the note for such errors, some may still exist.

## 2017-01-03 NOTE — PLAN OF CARE
Problem: Patient Care Overview (Adult)  Goal: Plan of Care Review  Outcome: Ongoing (interventions implemented as appropriate)    Problem: Pneumonia (Adult)  Intervention: Maximize Oxygenation/Ventilation/Perfusion    01/03/17 1746   Respiratory Interventions   Airway/Ventilation Management pulmonary hygiene promoted   Promote Aggressive Pulmonary Hygiene/Secretion Management   Cough And Deep Breathing done with encouragement   Positioning   Head Of Bed (HOB) Position HOB elevated         Goal: Signs and Symptoms of Listed Potential Problems Will be Absent or Manageable (Pneumonia)  Outcome: Ongoing (interventions implemented as appropriate)    01/03/17 1746   Pneumonia   Problems Assessed (Pneumonia) respiratory compromise;other (see comments)  (pain with deep breath)   Problems Present (Pneumonia) respiratory compromise;other (see comments)  (pain with deep breath)

## 2017-01-03 NOTE — PLAN OF CARE
Problem: Patient Care Overview (Adult)  Goal: Discharge Needs Assessment  Outcome: Ongoing (interventions implemented as appropriate)    01/03/17 1034 01/03/17 1128 01/03/17 1249   Discharge Needs Assessment   Discharge Planning Comments --  --  Patient is prisoner at [facility]. He will return to [facility] facility when medically stable.    Living Environment   Transportation Available --  agency transportation  ([facility]) --    Self-Care   Equipment Currently Used at Home none --  --

## 2017-01-03 NOTE — IP AVS SNAPSHOT
AFTER VISIT SUMMARY             Mario Nicholson           About your hospitalization     You were admitted on:  January 3, 2017 You last received care in the:  [facility] SURG       Procedures & Surgeries         Medications    If you or your caregiver advised us that you are currently taking a medication and that medication is marked below as “Resume”, this simply indicates that we have reviewed those medications to make sure our new therapy recommendations do not interfere.  If you have concerns about medications other than those new ones which we are prescribing today, please consult the physician who prescribed them (or your primary physician).  Our review of your home medications is not meant to indicate that we are directing their use.             Your Medications      START taking these medications      MG capsule   Take 100 mg by mouth 2 (Two) Times a Day.   Last time this was given:  1/5/2017  9:51 AM   Next Dose Due:  01/06/2016 6:00 PM           ipratropium-albuterol 0.5-2.5 mg/mL nebulizer   Take 3 mL by nebulization 4 (Four) Times a Day.   Last time this was given:  1/6/2017 11:44 AM   Commonly known as:  DUO-NEB   Next Dose Due:  01/06/2017 6:00 PM           levoFLOXacin 750 MG tablet   Take 1 tablet by mouth Daily for 6 days. Indications: Pneumonia   Last time this was given:  1/5/2017 10:10 PM   Commonly known as:  LEVAQUIN   Next Dose Due:  01/06/2017 6:00 PM           predniSONE 20 MG tablet   Take 1 tablet by mouth Daily for 1 dose.   Last time this was given:  1/6/2017  9:47 AM   Commonly known as:  DELTASONE   Next Dose Due:  01/07/2017 8:00 AM           predniSONE 5 MG tablet   Take 3 tablets by mouth Daily for 3 doses.   Last time this was given:  1/6/2017  9:47 AM   Commonly known as:  DELTASONE   Start taking on:  1/8/2017           predniSONE 10 MG tablet   Take 1 tablet by mouth Daily for 3 doses.   Last time this was given:  1/6/2017  9:47 AM   Commonly known as:   DELTASONE   Start taking on:  1/11/2017           predniSONE 5 MG tablet   Take 1 tablet by mouth Daily for 3 doses.   Last time this was given:  1/6/2017  9:47 AM   Commonly known as:  DELTASONE   Start taking on:  1/14/2017             CONTINUE taking these medications     acetaminophen 325 MG tablet   Take 650 mg by mouth Every 6 (Six) Hours As Needed for mild pain (1-3).   Last time this was given:  1/5/2017 10:06 PM   Commonly known as:  TYLENOL           albuterol 108 (90 BASE) MCG/ACT inhaler   Inhale 2 puffs Every 4 (Four) Hours As Needed for wheezing.   Commonly known as:  PROVENTIL HFA;VENTOLIN HFA           aspirin 81 MG EC tablet   Take 81 mg by mouth Daily.   Last time this was given:  1/6/2017  9:47 AM   Next Dose Due:  01/07/2017 8:00 AM           clopidogrel 75 MG tablet   Take 75 mg by mouth Daily.   Last time this was given:  1/6/2017  9:47 AM   Commonly known as:  PLAVIX   Next Dose Due:  01/07/2017 8:00 AM           furosemide 40 MG tablet   Take 40 mg by mouth Daily.   Commonly known as:  LASIX   Next Dose Due:  01/07/2017 8:00 AM           nitroglycerin 0.4 MG SL tablet   Place 0.4 mg under the tongue Every 5 (Five) Minutes As Needed for chest pain. Take no more than 3 doses in 15 minutes.   Commonly known as:  NITROSTAT           simvastatin 40 MG tablet   Take 40 mg by mouth Every Night.   Commonly known as:  ZOCOR   Next Dose Due:  01/06/2017 6:00 PM                Where to Get Your Medications      Information about where to get these medications is not yet available     ! Ask your nurse or doctor about these medications      MG capsule    ipratropium-albuterol 0.5-2.5 mg/mL nebulizer    levoFLOXacin 750 MG tablet    predniSONE 10 MG tablet    predniSONE 20 MG tablet    predniSONE 5 MG tablet    predniSONE 5 MG tablet                  Your Medications      Your Medication List           Morning Noon Evening Bedtime As Needed    acetaminophen 325 MG tablet   Take 650 mg by mouth  Every 6 (Six) Hours As Needed for mild pain (1-3).   Commonly known as:  TYLENOL                                   albuterol 108 (90 BASE) MCG/ACT inhaler   Inhale 2 puffs Every 4 (Four) Hours As Needed for wheezing.   Commonly known as:  PROVENTIL HFA;VENTOLIN HFA                                   aspirin 81 MG EC tablet   Take 81 mg by mouth Daily.            01/07/2017 8:00 AM                       clopidogrel 75 MG tablet   Take 75 mg by mouth Daily.   Commonly known as:  PLAVIX            01/07/2017 8:00 AM                        MG capsule   Take 100 mg by mouth 2 (Two) Times a Day.                       01/06/2016 6:00 PM               furosemide 40 MG tablet   Take 40 mg by mouth Daily.   Commonly known as:  LASIX            01/07/2017 8:00 AM                       ipratropium-albuterol 0.5-2.5 mg/mL nebulizer   Take 3 mL by nebulization 4 (Four) Times a Day.   Commonly known as:  DUO-NEB                          01/06/2017 6:00 PM                  levoFLOXacin 750 MG tablet   Take 1 tablet by mouth Daily for 6 days. Indications: Pneumonia   Commonly known as:  LEVAQUIN                    01/06/2017 6:00 PM               nitroglycerin 0.4 MG SL tablet   Place 0.4 mg under the tongue Every 5 (Five) Minutes As Needed for chest pain. Take no more than 3 doses in 15 minutes.   Commonly known as:  NITROSTAT                                   * predniSONE 20 MG tablet   Take 1 tablet by mouth Daily for 1 dose.   Commonly known as:  DELTASONE            01/07/2017 8:00 AM                       * predniSONE 5 MG tablet   Take 3 tablets by mouth Daily for 3 doses.   Commonly known as:  DELTASONE   Start taking on:  1/8/2017 01/08/2017 8:00 AM                       * predniSONE 10 MG tablet   Take 1 tablet by mouth Daily for 3 doses.   Commonly known as:  DELTASONE   Start taking on:  1/11/2017                                * predniSONE 5 MG tablet   Take 1 tablet by mouth Daily for 3 doses.    Commonly known as:  DELTASONE   Start taking on:  2017                                simvastatin 40 MG tablet   Take 40 mg by mouth Every Night.   Commonly known as:  ZOCOR                    2017 6:00 PM               * Notice:  This list has 4 medication(s) that are the same as other medications prescribed for you. Read the directions carefully, and ask your doctor or other care provider to review them with you.             Instructions for After Discharge        Activity Instructions     Activity as Tolerated                 Diet Instructions     Diet: Cardiac; Thin Liquids, No Restrictions       Discharge Diet:  Cardiac   Fluid Consistency:  Thin Liquids, No Restrictions              Follow-ups for After Discharge        Follow-up Information     Follow up with No Known Provider .    Contact information:    [facility] [id number]        Referrals and Follow-ups to Schedule     Additional Follow-Up    As directed    4 weeks, for F/U of lung nodule   Follow Up Details:  pulmonary       Follow-Up    As directed    MD at [facility] (Dr. Perez or associate)   Follow Up Details:  upon arrival back to [facility]             Pharmacy Development Signup     [facility] allows you to send messages to your doctor, view your test results, renew your prescriptions, schedule appointments, and more. To sign up, go to [employer] and click on the Sign Up Now link in the New User? box. Enter your Pharmacy Development Activation Code exactly as it appears below along with the last four digits of your Social Security Number and your Date of Birth () to complete the sign-up process. If you do not sign up before the expiration date, you must request a new code.    Pharmacy Development Activation Code: [email or web address]  Expires: 2017  2:48 PM    If you have questions, you can email [email or web address] or call [phone] to talk to our Pharmacy Development staff. Remember, Pharmacy Development is NOT to be used for urgent needs. For  medical emergencies, dial 911.           Summary of Your Hospitalization        Reason for Hospitalization     Your primary diagnosis was:  Not on File    Your diagnoses also included:  Atypical Chest Pain, Coronary Heart Disease, Ischemic Cardiomyopathy, Pneumonia      Care Providers     Provider Service Role Specialty    Jamin Nixon MD Internal Medicine Attending Provider Internal Medicine    Niall Foster MD -- Consulting Physician  Pulmonary Disease      Your Allergies  Date Reviewed: 1/5/2017    Allergen Reactions    Azithromycin Angioedema         Contrast Dye Other (See Comments)      Pending Labs     Order Current Status    Blood Culture Preliminary result    Blood Culture Preliminary result      Patient Belongings Returned     Document Return of Belongings Flowsheet     Were the patient bedside belongings sent home?   Yes   Belongings Retrieved from Security & Sent Home   --    Belongings Sent to Safe   --   Medications Retrieved from Pharmacy & Sent Home   Yes               SYMPTOMS OF A STROKE    Call 911 or have someone take you to the Emergency Department if you have any of the following:    · Sudden numbness or weakness of your face, arm or leg especially on one side of the body  · Sudden confusion, diffiiculty speaking or trouble understanding   · Changes in your vision or loss of sight in one eye  · Sudden severe headache with no known cause  · sudden dizziness, trouble walking, loss of balance or coordination    It is important to seek emergency care right away if you have further stroke symptoms. If you get emergency help quickly, the powerful clot-dissolving medicines can reduce the disabilities caused by a stroke.     For more information:    American Stroke Association  1-508-9-STROKE  www.strokeassociation.org           IF YOU SMOKE OR USE TOBACCO PLEASE READ THE FOLLOWING:    Why is smoking bad for me?  Smoking increases the risk of heart disease, lung disease, vascular disease,  stroke, and cancer.     If you smoke, STOP!    If you would like more information on quitting smoking, please visit the [employer] website: [email or web address]   This link will provide additional resources including the QUIT line and the Beat the Pack support groups.     For more information:    American Cancer Society  ([phone] 317-2499    American Heart Association  1-372.219.5088               YOU ARE THE MOST IMPORTANT FACTOR IN YOUR RECOVERY.     Follow all instructions carefully.     I have reviewed my discharge instructions with my nurse, including the following information, if applicable:     Information about my illness and diagnosis   Follow up appointments (including lab draws)   Wound Care   Equipment Needs   Medications (new and continuing) along with side effects   Preventative information such as vaccines and smoking cessations   Diet   Pain   I know when to contact my Doctor's office or seek emergency care      I want my nurse to describe the side effects of my medications: YES NO   If the answer is no, I understand the side effects of my medications: YES NO   My nurse described the side effects of my medications in a way that I could understand: YES NO   I have taken my personal belongings and my own medications with me at discharge: YES NO            I have received this information and my questions have been answered. I have discussed any concerns I see with this plan with the nurse or physician. I understand these instructions.    Signature of Patient or Responsible Person: _____________________________________    Date: _________________  Time: __________________    Signature of Healthcare Provider: _______________________________________  Date: _________________  Time: __________________

## 2017-01-03 NOTE — CONSULTS
Date of Hospital Visit: 17  Encounter Provider: Cole Adames MD  Place of Service: [facility] CARDIOLOGY  Patient Name: Mario Nicholson  :1955  Referral Provider: Jamin Nixon MD    Chief complaint: CP    History of Present Illness    Mr. Nicholson is a 60yo man with known CAD, who is currently residing at [facility], who was brought in for chest pain.    He suffered an anterior STEMI in 2016 and was treated at [facility]; his usual cardiologist is Dr. Farhad Irwin.  He was found to have 100% occlusion of the left main and underwent placement of two drug eluting stents from the mid-LM into the proximal LAD.  He had mild diffuse disease elsewhere (20-40%).  He had an LVEF of 25% at the time with anterior akinesis.  He required an IABP for a while, and did have VT.  He ultimately underwent placement of a single chamber Medtronic ICD in 2016.    He has occasional palpitations but hasn't been shocked.  He does not have exertional angina.  He has mild chronic exertional dyspnea but denies edema or orthopnea.  He denies syncope.    Over the last day, he's had sharp pain in the left lower chest that worsens with movement, with deep breathing, with coughing, and with palpitation.  He's been coughing a lot.    Past Medical History   Diagnosis Date   • COPD (chronic obstructive pulmonary disease)    • Coronary artery disease      anterior STEMI 2016 ([facility]), wotj 100% LM s/p 3.5x23 and 3x12 Xience [place] to the mid-distal LM and the distal LM to LAD; 40-50% prox LAD, 30% D1, 20% mid LCx, 20-30% diffuse RCA   • Hyperlipidemia    • Hypertension    • ICD (implantable cardioverter-defibrillator) in place      Medtronic single lead,    • Ischemic cardiomyopathy      EF 25% 2016, with anterior akinesis       Past Surgical History   Procedure Laterality Date   • Coronary artery bypass graft     • Cardiac defibrillator placement         Prior to  Admission medications    Medication Sig Start Date End Date Taking? Authorizing Provider   acetaminophen (TYLENOL) 325 MG tablet Take 650 mg by mouth Every 6 (Six) Hours As Needed for mild pain (1-3).   Yes Historical Provider, MD   albuterol (PROVENTIL HFA;VENTOLIN HFA) 108 (90 BASE) MCG/ACT inhaler Inhale 2 puffs Every 4 (Four) Hours As Needed for wheezing.   Yes Historical Provider, MD   aspirin 81 MG EC tablet Take 81 mg by mouth Daily.   Yes Historical Provider, MD   clopidogrel (PLAVIX) 75 MG tablet Take 75 mg by mouth Daily.   Yes Historical Provider, MD   furosemide (LASIX) 40 MG tablet Take 40 mg by mouth Daily.   Yes Historical Provider, MD   nitroglycerin (NITROSTAT) 0.4 MG SL tablet Place 0.4 mg under the tongue Every 5 (Five) Minutes As Needed for chest pain. Take no more than 3 doses in 15 minutes.   Yes Historical Provider, MD   simvastatin (ZOCOR) 40 MG tablet Take 40 mg by mouth Every Night.   Yes Historical Provider, MD       Social History     Social History   • Marital status: Unknown     Spouse name: N/A   • Number of children: N/A   • Years of education: N/A     Occupational History   • Not on file.     Social History Main Topics   • Smoking status: Former Smoker     Packs/day: 2.00     Years: 55.00   • Smokeless tobacco: Not on file   • Alcohol use No   • Drug use: No   • Sexual activity: No     Other Topics Concern   • Not on file     Social History Narrative   • No narrative on file       History reviewed. No pertinent family history.    REVIEW OF SYSTEMS:   CONSTITUTIONAL: No weight loss, fever, chills, weakness or fatigue.   HEENT: Eyes: No visual loss, blurred vision, double vision or yellow sclerae. Ears, Nose, Throat: No hearing loss, sneezing, congestion, runny nose or sore throat.   SKIN: No rash or itching.   CARDIOVASCULAR:As per HPI  RESPIRATORY: as per HPI  GASTROINTESTINAL: No anorexia, nausea, vomiting or diarrhea. No abdominal pain, bright red blood per rectum or  "melena.  GENITOURINARY: no burning on urination, hematuria, increased frequency.  NEUROLOGICAL: No headache, dizziness, syncope, paralysis, ataxia, numbness or tingling in the extremities. No change in bowel or bladder control.   MUSCULOSKELETAL: No muscle, back pain, joint pain or stiffness.   HEMATOLOGIC: No anemia, bleeding or bruising.   LYMPHATICS: No enlarged nodes. No history of splenectomy.   PSYCHIATRIC: No history of depression or anxiety.   ENDOCRINOLOGIC: No reports of sweating, cold or heat intolerance. No polyuria or polydipsia.   ALLERGIES: No history of asthma, hives, eczema or rhinitis.    Objective:     Vitals:    01/03/17 0909 01/03/17 0932 01/03/17 1209 01/03/17 1237   BP:  106/73 100/67    BP Location:  Left arm Left arm    Patient Position:  Lying Lying    Pulse: 71 79 81 81   Resp: 20 20 20 20   Temp:  97 °F (36.1 °C) 96.9 °F (36.1 °C)    TempSrc:  Oral Oral    SpO2: 96% 98% 97% 96%   Weight:  164 lb 3.2 oz (74.5 kg)     Height:  69\" (175.3 cm)       Body mass index is 24.25 kg/(m^2).  Flowsheet Rows         First Filed Value    Admission Height  71\" (180.3 cm) Documented at 01/03/2017 0059    Admission Weight  177 lb (80.3 kg) Documented at 01/03/2017 0059          Physical Exam   Constitutional: He is oriented to person, place, and time. He appears well-developed and well-nourished.   HENT:   Head: Normocephalic.   Nose: Nose normal.   Mouth/Throat: Oropharynx is clear and moist.   Eyes: Conjunctivae and EOM are normal. Pupils are equal, round, and reactive to light.   Neck: Normal range of motion. No JVD present.   Cardiovascular: Normal rate, regular rhythm, normal heart sounds and intact distal pulses.    + TTP left anterior ribcage   Pulmonary/Chest: Effort normal and breath sounds normal.   Abdominal: Soft. He exhibits no mass. There is no tenderness.   Musculoskeletal: Normal range of motion. He exhibits no edema.   Neurological: He is alert and oriented to person, place, and time. No " cranial nerve deficit.   Skin: Skin is warm and dry. No erythema.   Psychiatric: He has a normal mood and affect. His behavior is normal. Judgment and thought content normal.   Vitals reviewed.              Lab Review:                  Results from last 7 days  Lab Units 01/03/17  0029   SODIUM mmol/L 136   POTASSIUM mmol/L 4.0   CHLORIDE mmol/L 98   TOTAL CO2 mmol/L 21.4*   BUN mg/dL 17   CREATININE mg/dL 1.19   GLUCOSE mg/dL 99   CALCIUM mg/dL 9.2       Results from last 7 days  Lab Units 01/03/17  1159 01/03/17  0533 01/03/17  0029   TROPONIN T ng/mL <0.010 <0.010 0.012       Results from last 7 days  Lab Units 01/03/17  0029   WBC 10*3/mm3 10.31   HEMOGLOBIN g/dL 13.2*   HEMATOCRIT % 40.0*   PLATELETS 10*3/mm3 234       Results from last 7 days  Lab Units 01/03/17  0029   INR  1.12*                   I personally viewed and interpreted the patient's EKG/Telemetry data -- NSR, PVC, anterior infarct, IVCD, no change from 5/2016    Assessment/Plan:       1.   Atypical chest pain -- despite his known CAD, this is NOT cardiac chest pain.  It's pleuritic, reproducible with palpation, and worsens with movement of the torso.  No ischemic workup is needed.    2.   Coronary artery disease, s/p anterior STEMI and ROXANA x 2 to LM/LAD.  He should remain on aspirin and clopidogrel for life.  I'll change simvastatin to atorvastatin.  3.   Ischemic cardiomyopathy -- EF 25%, s/p ICD.  He's euvolemic.  I had his device checked today.  He has seconds long NSVT but no sustained arrhythmia and no tachy-therapy has been needed.  Ideally he'd be on carvedilol, but it seems his BP is too low right now.  No ACE/ARB due to low BP.  4.   Pneumonia -- per primary team.      Will see as needed.  He should f/u with me in office in six months.

## 2017-01-03 NOTE — Clinical Note
Level of Care: Telemetry [5]   Diagnosis: Chest pain in adult [[id number]]   Admitting Physician: CARYN RAGLAND [[id number]]   Estimated length of stay?: 3-4 days   Certification: I certify that inpatient services are medically necessary for this patient for a duration of greater than two midnights. See H&P and MD Progress Notes for additional information about the patient's course of treatment.

## 2017-01-03 NOTE — IP AVS SNAPSHOT
RENATO KAN   Facility: [facility] ([place])   SA: [facility]    MRN:  [id number]   PT#:     Report:  [id number] - Summary of Care Document           Basic Information     Date Of Birth Sex Race Ethnicity Preferred Language Preferred Written Language    1955 Male White or  Not  or  English English      Allergies as of 1/6/2017  Reviewed On: 1/5/2017 By: Katy Izquierdo RN       Noted Reaction Type Reactions    Azithromycin 01/05/2017   Allergy Angioedema    Contrast Dye 01/03/2017    Other (See Comments)      Current Medications Are     acetaminophen (TYLENOL) 325 MG tablet Take 650 mg by mouth Every 6 (Six) Hours As Needed for mild pain (1-3).    albuterol (PROVENTIL HFA;VENTOLIN HFA) 108 (90 BASE) MCG/ACT inhaler Inhale 2 puffs Every 4 (Four) Hours As Needed for wheezing.    aspirin 81 MG EC tablet Take 81 mg by mouth Daily.    clopidogrel (PLAVIX) 75 MG tablet Take 75 mg by mouth Daily.    docusate sodium 100 MG capsule Take 100 mg by mouth 2 (Two) Times a Day.    furosemide (LASIX) 40 MG tablet Take 40 mg by mouth Daily.    ipratropium-albuterol (DUO-NEB) 0.5-2.5 mg/mL nebulizer Take 3 mL by nebulization 4 (Four) Times a Day.    levoFLOXacin (LEVAQUIN) 750 MG tablet Take 1 tablet by mouth Daily for 6 days. Indications: Pneumonia    nitroglycerin (NITROSTAT) 0.4 MG SL tablet Place 0.4 mg under the tongue Every 5 (Five) Minutes As Needed for chest pain. Take no more than 3 doses in 15 minutes.    predniSONE (DELTASONE) 10 MG tablet Starting on 1/11/2017. Take 1 tablet by mouth Daily for 3 doses.    predniSONE (DELTASONE) 20 MG tablet Take 1 tablet by mouth Daily for 1 dose.    predniSONE (DELTASONE) 5 MG tablet Starting on 1/8/2017. Take 3 tablets by mouth Daily for 3 doses.    predniSONE (DELTASONE) 5 MG tablet Starting on 1/14/2017. Take 1 tablet by mouth Daily for 3 doses.    simvastatin (ZOCOR) 40 MG tablet Take 40 mg by mouth Every Night.         Current Immunizations     No immunizations on file.      Problem List as of 1/6/2017  Never Reviewed             Codes Priority Class Noted - Resolved            Atypical chest pain ICD-10-CM: R07.89  ICD-9-CM: 786.59   1/3/2017 - Present    Coronary artery disease ICD-10-CM: I25.10  ICD-9-CM: 414.00   Unknown - Present    Overview Signed 1/3/2017  1:40 PM by Cole Adames MD     anterior STEMI 1/2016 ([facility]), wotj 100% LM s/p 3.5x23 and 3x12 [place] to the mid-distal LM and the distal LM to LAD; 40-50% prox LAD, 30% D1, 20% mid LCx, 20-30% diffuse RCA         Ischemic cardiomyopathy ICD-10-CM: I25.5  ICD-9-CM: 414.8   Unknown - Present    Overview Signed 1/3/2017  1:40 PM by Cole Adames MD     EF 25% 1/2016, with anterior akinesis         Pneumonia ICD-10-CM: J18.9  ICD-9-CM: 486   1/3/2017 - Present      Diagnoses        Codes Comments    Chest pain in adult    -  Primary ICD-10-CM: R07.9  ICD-9-CM: 786.50     Pneumonia of right lower lobe due to infectious organism     ICD-10-CM: J18.9  ICD-9-CM: 483.8       Lab and Imaging Results     Procedure Component Value Units Date/Time    Extra Tubes [[id number]] Collected:  01/06/17 0619    Specimen:  Blood from Blood, Venous Line Updated:  01/06/17 1101    Narrative:       The following orders were created for panel order Extra Tubes.  Procedure                               Abnormality         Status                     ---------                               -----------         ------                     Light Blue Top[[id number]]                                    Final result               Lavender Top[[id number]]                                      Final result                 Please view results for these tests on the individual orders.    Light Blue Top [[id number]] Collected:  01/06/17 0619    Specimen:  Blood Updated:  01/06/17 1101     Extra Tube hold for add-on       Auto resulted       Lavender Top [[id number]] Collected:  01/06/17  0619    Specimen:  Blood Updated:  01/06/17 1101     Extra Tube hold for add-on       Auto resulted       Basic Metabolic Panel [[id number]]  (Abnormal) Collected:  01/06/17 0619    Specimen:  Blood Updated:  01/06/17 0642     Glucose 122 (H) mg/dL      BUN 20 mg/dL      Creatinine 1.03 mg/dL      Sodium 137 mmol/L      Potassium 5.1 mmol/L      Chloride 107 mmol/L      CO2 15.4 (L) mmol/L      Calcium 8.9 mg/dL      eGFR Non African Amer 73 mL/min/1.73      BUN/Creatinine Ratio 19.4      Anion Gap 14.6 mmol/L     Narrative:       GFR Normal >60  Chronic Kidney Disease <60  Kidney Failure <15    Blood Culture [[id number]]  (Normal) Collected:  01/03/17 0533    Specimen:  Blood from Arm, Right Updated:  01/06/17 0601     Blood Culture No growth at 3 days     Blood Culture [[id number]]  (Normal) Collected:  01/03/17 0533    Specimen:  Blood from Arm, Left Updated:  01/06/17 0601     Blood Culture No growth at 3 days     Respiratory Culture [[id number]] Collected:  01/03/17 1623    Specimen:  Sputum from Cough Updated:  01/05/17 0913     Respiratory Culture Scant growth (1+) Normal Respiratory Camille      Gram Stain Result        Moderate (3+) Mixed bacterial morphotypes seen on Gram Stain    Comprehensive Metabolic Panel [[id number]]  (Abnormal) Collected:  01/05/17 0656    Specimen:  Blood Updated:  01/05/17 0736     Glucose 108 (H) mg/dL      BUN 12 mg/dL      Creatinine 1.06 mg/dL      Sodium 139 mmol/L      Potassium 4.7 mmol/L      Chloride 108 (H) mmol/L      CO2 17.0 (L) mmol/L      Calcium 8.9 mg/dL      Total Protein 6.2 g/dL      Albumin 3.20 (L) g/dL      ALT (SGPT) 13 U/L      AST (SGOT) 14 U/L      Alkaline Phosphatase 83 U/L      Total Bilirubin 0.8 mg/dL      eGFR Non African Amer 71 mL/min/1.73      Globulin 3.0 gm/dL      A/G Ratio 1.1 g/dL      BUN/Creatinine Ratio 11.3      Anion Gap 14.0 mmol/L     Lactic Acid, Plasma [[id number]]  (Normal) Collected:  01/05/17 0656    Specimen:  Blood Updated:  01/05/17 0730      Lactate 1.4 mmol/L     CBC & Differential [[id number]] Collected:  01/05/17 0656    Specimen:  Blood Updated:  01/05/17 0723    Narrative:       The following orders were created for panel order CBC & Differential.  Procedure                               Abnormality         Status                     ---------                               -----------         ------                     CBC Auto Differential[[id number]]         Abnormal            Final result                 Please view results for these tests on the individual orders.    CBC Auto Differential [[id number]]  (Abnormal) Collected:  01/05/17 0656    Specimen:  Blood Updated:  01/05/17 0723     WBC 6.11 10*3/mm3      RBC 4.21 (L) 10*6/mm3      Hemoglobin 12.1 (L) g/dL      Hematocrit 38.1 (L) %      MCV 90.5 fL      MCH 28.7 pg      MCHC 31.8 g/dL      RDW 16.0 (H) %      RDW-SD 52.7 fl      MPV 11.8 (H) fL      Platelets 203 10*3/mm3      Neutrophil % 83.8 (H) %      Lymphocyte % 7.7 (L) %      Monocyte % 3.6 %      Eosinophil % 3.1 %      Basophil % 1.3 %      Immature Grans % 0.5 %      Neutrophils, Absolute 5.12 10*3/mm3      Lymphocytes, Absolute 0.47 (L) 10*3/mm3      Monocytes, Absolute 0.22 10*3/mm3      Eosinophils, Absolute 0.19 10*3/mm3      Basophils, Absolute 0.08 10*3/mm3      Immature Grans, Absolute 0.03 10*3/mm3      nRBC 0.0 /100 WBC     Lactic Acid, Plasma [[id number]]  (Normal) Collected:  01/04/17 0552    Specimen:  Blood Updated:  01/04/17 0642     Lactate 1.4 mmol/L     Comprehensive Metabolic Panel [[id number]]  (Abnormal) Collected:  01/04/17 0552    Specimen:  Blood Updated:  01/04/17 0642     Glucose 101 (H) mg/dL      BUN 15 mg/dL      Creatinine 1.01 mg/dL      Sodium 138 mmol/L      Potassium 4.2 mmol/L      Chloride 106 mmol/L      CO2 18.6 (L) mmol/L      Calcium 8.4 (L) mg/dL      Total Protein 6.0 g/dL      Albumin 3.40 (L) g/dL      ALT (SGPT) 11 U/L      AST (SGOT) 12 U/L      Alkaline Phosphatase 77 U/L      Total  Bilirubin 0.6 mg/dL      eGFR Non African Amer 75 mL/min/1.73      Globulin 2.6 gm/dL      A/G Ratio 1.3 g/dL      BUN/Creatinine Ratio 14.9      Anion Gap 13.4 mmol/L     CBC & Differential [[id number]] Collected:  01/04/17 0552    Specimen:  Blood Updated:  01/04/17 0612    Narrative:       The following orders were created for panel order CBC & Differential.  Procedure                               Abnormality         Status                     ---------                               -----------         ------                     CBC Auto Differential[[id number]]         Abnormal            Final result                 Please view results for these tests on the individual orders.    CBC Auto Differential [[id number]]  (Abnormal) Collected:  01/04/17 0552    Specimen:  Blood Updated:  01/04/17 0612     WBC 6.54 10*3/mm3      RBC 4.18 (L) 10*6/mm3      Hemoglobin 12.2 (L) g/dL      Hematocrit 37.7 (L) %      MCV 90.2 fL      MCH 29.2 pg      MCHC 32.4 g/dL      RDW 15.8 (H) %      RDW-SD 52.0 fl      MPV 11.4 (H) fL      Platelets 184 10*3/mm3      Neutrophil % 73.4 (H) %      Lymphocyte % 15.9 (L) %      Monocyte % 5.8 %      Eosinophil % 3.1 %      Basophil % 1.5 %      Immature Grans % 0.3 %      Neutrophils, Absolute 4.80 10*3/mm3      Lymphocytes, Absolute 1.04 10*3/mm3      Monocytes, Absolute 0.38 10*3/mm3      Eosinophils, Absolute 0.20 10*3/mm3      Basophils, Absolute 0.10 10*3/mm3      Immature Grans, Absolute 0.02 10*3/mm3      nRBC 0.0 /100 WBC     Respiratory Panel, PCR [[id number]]  (Normal) Collected:  01/03/17 1351    Specimen:  Swab from Nares Updated:  01/03/17 9558     ADENOVIRUS, PCR Not Detected      Coronavirus 229E Not Detected      Coronavirus HKU1 Not Detected      Coronavirus NL63 Not Detected      Coronavirus OC43 Not Detected      Human Metapneumovirus Not Detected      Human Rhinovirus/Enterovirus Not Detected      Influenza B PCR Not Detected      Parainfluenza Virus 1 Not Detected       Parainfluenza Virus 2 Not Detected      Parainfluenza Virus 3 Not Detected      Parainfluenza Virus 4 Not Detected      Bordetella pertussis pcr Not Detected      Influenza 2009 H1N1 by PCR Not Detected      Chlamydophila pneumoniae PCR Not Detected      Mycoplasma pneumo by PCR Not Detected      Influenza A PCR Not Detected      Influenza A H3 Not Detected      Influenza A H1 Not Detected      RSV, PCR Not Detected     Troponin [[id number]]  (Normal) Collected:  01/03/17 1159    Specimen:  Blood Updated:  01/03/17 1222     Troponin T <0.010 ng/mL     Narrative:       Troponin T Reference Ranges:  Less than 0.03 ng/mL:    Negative for AMI  0.03 to 0.09 ng/mL:      Indeterminant for AMI  Greater than 0.09 ng/mL: Positive for AMI    Lactic Acid, Plasma [[id number]]  (Normal) Collected:  01/03/17 0820    Specimen:  Blood Updated:  01/03/17 0850     Lactate 1.0 mmol/L     XR Chest 2 View [[id number]] Collected:  01/03/17 0814     Updated:  01/03/17 0832    Narrative:       PA AND LATERAL CHEST 01/03/2017 AT 0050 HOURS     HISTORY: Left side chest pain, shortness of breath, dizziness started  tonight. Previous history of myocardial infarct.     COMPARISON: None.     FINDINGS: Airspace disease is seen within the right lower lobe with  small right pleural effusion. No definite left lung infiltrate is  identified. Mild cardiomegaly. Left chest wall pacemaker placed with it  extending to the region of the right ventricle. No visible pneumothorax.       Impression:          1. Dense peripheral airspace disease within the right lower lobe with  small right pleural effusion.     2. Mild cardiomegaly.      This report was finalized on 1/3/2017 8:30 AM by Dr. Elizabeth Beckford MD.       CT Chest Without Contrast [[id number]] Collected:  01/03/17 0811     Updated:  01/03/17 0832    Narrative:       CT CHEST WITHOUT CONTRAST 01/03/2017 AT 0324 HOURS     HISTORY: 61-year-old male with complaints of left-sided chest pain  tonight worse with  deep inspiration. Productive cough for 2 days.  Emphysema. Previous myocardial infarction.     COMPARISON: PA and lateral chest radiograph 01/03/2017 at 0050 hours.     PROCEDURE: 5 mm noncontrast axial images through the chest. Sagittal and  coronal reformatted images were obtained.     FINDINGS: Advanced emphysematous changes are present predominantly in  the lung apices.     Dense peripheral airspace disease with air bronchograms is present in  the right lower lobe anteriorly and laterally abutting the major fissure  and lateral pleural margin. The area of involvement measures roughly 4.9  x 4.4 cm. More patchy airspace disease is seen peripherally within the  right lower lobe posterolaterally. Lesser degree of irregular infiltrate  is present in the central right upper lobe. Noncalcified nodule within  the left lower lobe (series 3, image 41) measures 5 mm. Minimal  posterior left basilar atelectasis. Trace right pleural effusion. Mild  cardiomegaly. Stent in the left anterior descending coronary artery. No  pericardial effusion.     There are enlarged mediastinal lymph nodes. There is a dominant  precarinal lymph node containing coarse eccentric calcification  measuring up to 1.8 cm short axis. Another coarsely calcified subcarinal  node measures up to 2 cm short axis. Other noncalcified nodes are  present in the upper mediastinum including a right paratracheal node  measuring 1.3 cm. Findings are nonspecific and could represent changes  of calcified and noncalcified granulomatous disease. Follow-up  recommended or correlation with outside imaging studies recommended to  document stability, however.     Left chest wall pacemaker in place.     No pneumothorax.     Included portions of the upper abdominal organs are within normal  limits. Small amount of excreted contrast material is seen within the  renal collecting systems. Tiny gallstones incidentally noted.  Degenerative endplate changes are present within  the thoracic spine. No  acute osseous abnormalities are identified.       Impression:       1. Dense peripheral airspace disease in the right lower lobe. More  patchy alveolar disease changes are present within the right lower lobe,  right upper lobe, and to a lesser degree left lower lobe. Findings are  favored to represent changes of pneumonia, given the appropriate  clinical context. Other etiologies such as pulmonary infarct not  excluded but thought less likely.     2. Enlarged mediastinal lymph nodes as described above. Both benign and  malignant etiologies in the differential. Correlation with outside  imaging studies recommended if available. Alternatively, short-term CT  chest follow-up within 6 months is recommended.     3. 5 mm noncalcified left lower lobe pulmonary nodule. This, too, can be  monitored at follow-up or at time of comparison with outside studies.     4. Moderately advanced emphysema.      5. Cardiomegaly with coronary artery stent placement.      6. Uncomplicated cholelithiasis.     7. Trace right pleural effusion.     8. Preliminary report was provided by Dr. Becerra on 01/03/2017 at 0402  hours.      This report was finalized on 1/3/2017 8:30 AM by Dr. Elizabeth Beckford MD.       Troponin [[id number]]  (Normal) Collected:  01/03/17 0533    Specimen:  Blood Updated:  01/03/17 0610     Troponin T <0.010 ng/mL     Narrative:       Troponin T Reference Ranges:  Less than 0.03 ng/mL:    Negative for AMI  0.03 to 0.09 ng/mL:      Indeterminant for AMI  Greater than 0.09 ng/mL: Positive for AMI    D-dimer, Quantitative [[id number]]  (Abnormal) Collected:  01/03/17 0030    Specimen:  Blood Updated:  01/03/17 0549     D-Dimer, Quantitative 0.47 (H) MCGFEU/mL     Narrative:       Can be elevated in, but is not diagnostic for deep vein thrombosis (DVT) or pulmonary embolis (PE).  It is also elevated in other medical conditions.  Clinical correlation is required.  The negative cut-off value for the D-Dimer  is 0.50 mcg FEU/mL for DVT and PE.    Comprehensive Metabolic Panel [[id number]]  (Abnormal) Collected:  01/03/17 0029    Specimen:  Blood Updated:  01/03/17 0112     Glucose 99 mg/dL      BUN 17 mg/dL      Creatinine 1.19 mg/dL      Sodium 136 mmol/L      Potassium 4.0 mmol/L      Chloride 98 mmol/L      CO2 21.4 (L) mmol/L      Calcium 9.2 mg/dL      Total Protein 6.9 g/dL      Albumin 3.60 g/dL      ALT (SGPT) 16 U/L      AST (SGOT) 15 U/L      Alkaline Phosphatase 90 U/L      Total Bilirubin 0.7 mg/dL      eGFR Non African Amer 62 mL/min/1.73      Globulin 3.3 gm/dL      A/G Ratio 1.1 g/dL      BUN/Creatinine Ratio 14.3      Anion Gap 16.6 mmol/L     Troponin [[id number]]  (Normal) Collected:  01/03/17 0029    Specimen:  Blood Updated:  01/03/17 0106     Troponin T 0.012 ng/mL     Narrative:       Troponin T Reference Ranges:  Less than 0.03 ng/mL:    Negative for AMI  0.03 to 0.09 ng/mL:      Indeterminant for AMI  Greater than 0.09 ng/mL: Positive for AMI    Protime-INR [[id number]]  (Abnormal) Collected:  01/03/17 0029    Specimen:  Blood Updated:  01/03/17 0058     Protime 14.1 Seconds      INR 1.12 (H)     Narrative:       Therapeutic Ranges for INR: 2.0-3.0 (PT 20-30)                              2.5-3.5 (PT 25-34)    CBC & Differential [[id number]] Collected:  01/03/17 0029    Specimen:  Blood Updated:  01/03/17 0042    Narrative:       The following orders were created for panel order CBC & Differential.  Procedure                               Abnormality         Status                     ---------                               -----------         ------                     CBC Auto Differential[[id number]]         Abnormal            Final result                 Please view results for these tests on the individual orders.    CBC Auto Differential [[id number]]  (Abnormal) Collected:  01/03/17 0029    Specimen:  Blood Updated:  01/03/17 0042     WBC 10.31 10*3/mm3      RBC 4.55 (L) 10*6/mm3      Hemoglobin 13.2  "(L) g/dL      Hematocrit 40.0 (L) %      MCV 87.9 fL      MCH 29.0 pg      MCHC 33.0 g/dL      RDW 15.4 (H) %      RDW-SD 49.4 fl      MPV 11.5 (H) fL      Platelets 234 10*3/mm3      Neutrophil % 78.6 (H) %      Lymphocyte % 11.3 (L) %      Monocyte % 6.4 %      Eosinophil % 2.3 %      Basophil % 1.1 %      Immature Grans % 0.3 %      Neutrophils, Absolute 8.11 10*3/mm3      Lymphocytes, Absolute 1.16 10*3/mm3      Monocytes, Absolute 0.66 10*3/mm3      Eosinophils, Absolute 0.24 10*3/mm3      Basophils, Absolute 0.11 10*3/mm3      Immature Grans, Absolute 0.03 10*3/mm3      nRBC 0.0 /100 WBC       Vitals     BP Pulse Temp Resp Ht Wt    108/79 (BP Location: Left arm, Patient Position: Lying) 86 98.1 °F (36.7 °C) (Oral) 18 69\" (175.3 cm) 164 lb 3.2 oz (74.5 kg)    SpO2 BMI             95% 24.25 kg/m2       Vitals History      Social History     Category History    Smoking Tobacco Use Former Smoker; Start date: ; 2 packs/day for 55 years (110 pk yrs)    Smokeless Tobacco Use Unknown    Tobacco Comment     Alcohol Use No    Drug Use No    Sexual Activity No    ADL Not Asked      Patient Care Team        Relationship Specialty Notifications Start End    No Known Provider PCP - General   1/3/17     Comment:  PT IS AN INMATE       Instructions for After Discharge        Follow-up Information     Follow up with No Known Provider .    Contact information:    [facility] [id number]        Activity Instructions     Activity as Tolerated                 Diet Instructions     Diet: Cardiac; Thin Liquids, No Restrictions       Discharge Diet:  Cardiac   Fluid Consistency:  Thin Liquids, No Restrictions             Referrals and Follow-ups to Schedule     Additional Follow-Up    As directed    4 weeks, for F/U of lung nodule   Follow Up Details:  pulmonary       Follow-Up    As directed    MD at [facility] (Dr. Perez or associate)   Follow Up Details:  upon arrival back to [facility]             "

## 2017-01-03 NOTE — CONSULTS
Group: [facility]      Patient Identification:  Mario Nicholson  61 y.o.  male  1955  [id number]          LOS 1    Requesting physician: Dr Nixon    Reason for Consultation:  PNA    History of Present Illness:   Thank you for this pulmonary consult.  61-year-old male presents with shortness of breath and cough and associated left side pleuritic chest pain.  He has a history of coronary artery disease and status post CABG.  He also has a defibrillator.  Chest pain described as sharp without radiation.  Has had a productive cough of green sputum which he says is been going on for several days.  Is a prisoner at [facility].  Was recently on an unknown antibiotic.  He did not finish that course.  Shortness of breath is worse with activity.  Was a previous smoker and quit 2014.  Cough is moderate in nature.  Denies any wheezing.  Denies davie hemoptysis.    Past Medical History:  Past Medical History   Diagnosis Date   • COPD (chronic obstructive pulmonary disease)    • Coronary artery disease    • Hyperlipidemia    • Hypertension        Past Surgical History:  Past Surgical History   Procedure Laterality Date   • Coronary artery bypass graft     • Cardiac defibrillator placement          Home Meds:  Prescriptions Prior to Admission   Medication Sig Dispense Refill Last Dose   • acetaminophen (TYLENOL) 325 MG tablet Take 650 mg by mouth Every 6 (Six) Hours As Needed for mild pain (1-3).      • albuterol (PROVENTIL HFA;VENTOLIN HFA) 108 (90 BASE) MCG/ACT inhaler Inhale 2 puffs Every 4 (Four) Hours As Needed for wheezing.      • aspirin 81 MG EC tablet Take 81 mg by mouth Daily.      • clopidogrel (PLAVIX) 75 MG tablet Take 75 mg by mouth Daily.      • furosemide (LASIX) 40 MG tablet Take 40 mg by mouth Daily.      • nitroglycerin (NITROSTAT) 0.4 MG SL tablet Place 0.4 mg under the tongue Every 5 (Five) Minutes As Needed for chest pain. Take no more than 3 doses in 15 minutes.      • simvastatin (ZOCOR) 40 MG  "tablet Take 40 mg by mouth Every Night.            Allergies:  Allergies   Allergen Reactions   • Contrast Dye Other (See Comments)       Social History:   Social History     Social History   • Marital status: Unknown     Spouse name: N/A   • Number of children: N/A   • Years of education: N/A     Occupational History   • Not on file.     Social History Main Topics   • Smoking status: Former Smoker     Packs/day: 2.00     Years: 55.00   • Smokeless tobacco: Not on file   • Alcohol use No   • Drug use: No   • Sexual activity: No     Other Topics Concern   • Not on file     Social History Narrative   • No narrative on file       Family History:  History reviewed. No pertinent family history.    Review of Systems:    Constitutional:      denies fever, chills, weight loss  Skin:       denies rash   Otolaryngeal:        denies visual problems, epistaxis  Cardiovascular:    Denies  edema or palpitations  Pulmonary:           As above  Gastrointestinal:   denies difficulty swallowing , constipation, diarrhea  Genitourinary:       denies hematuria, dysuria  Hematologic:        denies easy bruising, anemia, abnormal bleeding  Musculoskeletal:   denies muscle pain, focal weakness, falls  Neurologic:           denies seizures, LOC or focal weakness    Objective:    PHYSICAL EXAM:    Visit Vitals   • /67 (BP Location: Left arm, Patient Position: Lying)   • Pulse 81   • Temp 96.9 °F (36.1 °C) (Oral)   • Resp 20   • Ht 69\" (175.3 cm)   • Wt 164 lb 3.2 oz (74.5 kg)   • SpO2 97%   • BMI 24.25 kg/m2    Body mass index is 24.25 kg/(m^2). 97% 164 lb 3.2 oz (74.5 kg)    GENERAL APPEARANCE:   · Well developed  · Well nourished  · No acute distress   EYES:    · PERRL                                                                           · Conjunctiva normal  · Sclera non-icteric.  HENT:   · Atraumatic, normocephalic  · External ears and nose normal  · Moist mucus membranes and no ulcers  NECK:  · Thyroid not enlarged  · Trachea " midline   RESPIRATORY:    · Nonlabored breathing   · Bilateral diminished breath sounds  · No rales. No wheezing  · No dullness  CARDIOVASCULAR:    · RRR  · Normal S1, S2  · No murmur  · Lower extremity edema: none    GI:   · Bowel sounds normal  · Abdomen soft , non-distended, non-tender  · No abdominal masses.    MUSCULOSKELETAL:  · Normal movement of extremities  · No tenderness, no deformities  · No clubbing or cyanosis   Skin:    · No visible rashes  · No palpable nodules  PSYCHIATRIC:  · Speech and behavior appropriate  · Normal mood and affect  · Oriented to person, place and time  NEUROLOGIC:      Lab Review:      Lab Results   Component Value Date    WBC 10.31 01/03/2017    HGB 13.2 (L) 01/03/2017    HCT 40.0 (L) 01/03/2017    MCV 87.9 01/03/2017     01/03/2017            Lab Results   Component Value Date    CREATININE 1.19 01/03/2017    BUN 17 01/03/2017     01/03/2017    K 4.0 01/03/2017    CL 98 01/03/2017    CO2 21.4 (L) 01/03/2017        Lab Results   Component Value Date    TROPONINT <0.010 01/03/2017                 Imaging reviewed  chest X-ray and CT chest viewed       Assessment:  Bacterial bilateral pneumonia  5 mm noncalcified pulmonary nodule left lower lobe  History of tobacco use  Pleuritic chest pain  COPD  Coronary artery disease    Recommendations:  Send sputum for culture.  We'll also check respiratory viral panel.  Agree with current antibiotics of Rocephin and Zithromax.  Continue bronchodilators for COPD and help with clearance.      Thank you for allowing me to participate in the care of this patient.  I will continue to follow along with you.      Niall Foster MD  [facility], [facility]  Pulmonary and Critical Care Medicine    1/3/2017  12:36 PM

## 2017-01-03 NOTE — ED NOTES
First opportunity to get to patient because of [facility] ems and guards at bedside     Cortney Olvera RN  01/03/17 0016

## 2017-01-03 NOTE — ED PROVIDER NOTES
Subjective   History of Present Illness  History of Present Illness    Chief complaint: Chest pain    Location: left lateral chest    Quality/Severity:  Stabbing pain    Timing/Duration: Began this evening, recurrent     Modifying Factors: worse with movement, deep breaths    Narrative: Pt presents this evening via ambulance for evaluation of new onset chest pains.  He has a hx of CAD with an MI in 2016 that required stenting and subsequent defibrillator implantation.  These procedures were done in [place], [place].  Unfortunately, pt was placed under arrest about one month ago and was recently transferred to a local [facility] in our area.  He has been taking his usual daily heart-related medications throughout his imprisonment, however he says that prior to his transfer to this new [facility], he was started briefly on someone unknown antibiotic for a pneumonia but never got to finish that prescription here.  Tonight, as he was lying in his bunk, he suddenly developed some new left sided chest pains that began to worry him.  He says the pain is worse with certain positions and is also worse with deep breaths.  He has had some moderate productive cough with yellow sputum for several weeks.  He also says that he does not smoke now but smoked for several years before recently quitting.  He denies any recent fevers.  He denies any nausea, vomiting, dizziness, or sweating.     Associated Symptoms: As above    Review of Systems   Constitutional: Negative for activity change, appetite change, chills and fever.   HENT: Positive for rhinorrhea and sinus pressure. Negative for congestion and trouble swallowing.    Eyes: Negative for pain and visual disturbance.   Respiratory: Positive for cough. Negative for shortness of breath, wheezing and stridor.    Cardiovascular: Positive for chest pain. Negative for palpitations and leg swelling.   Gastrointestinal: Negative for abdominal pain, diarrhea, nausea and vomiting.    Genitourinary: Negative for dysuria, flank pain and hematuria.   Musculoskeletal: Negative for arthralgias, back pain and myalgias.   Skin: Negative for color change and rash.   Neurological: Negative for syncope and headaches.   Psychiatric/Behavioral: Negative for confusion. The patient is not nervous/anxious.    All other systems reviewed and are negative.      Past Medical History   Diagnosis Date   • COPD (chronic obstructive pulmonary disease)    • Coronary artery disease    • Hyperlipidemia    • Hypertension        Allergies   Allergen Reactions   • Contrast Dye Other (See Comments)       Past Surgical History   Procedure Laterality Date   • Coronary artery bypass graft     • Cardiac defibrillator placement         History reviewed. No pertinent family history.    Social History     Social History   • Marital status: Unknown     Spouse name: N/A   • Number of children: N/A   • Years of education: N/A     Social History Main Topics   • Smoking status: Former Smoker     Packs/day: 2.00     Years: 55.00   • Smokeless tobacco: None   • Alcohol use No   • Drug use: No   • Sexual activity: Not Asked     Other Topics Concern   • None     Social History Narrative   • None     ED Triage Vitals   Temp Heart Rate Resp BP SpO2   01/03/17 0059 01/03/17 0016 01/03/17 0016 01/03/17 0016 01/03/17 0016   97.6 °F (36.4 °C) 79 20 115/84 96 %      Temp src Heart Rate Source Patient Position BP Location FiO2 (%)   -- -- -- -- --                  Objective   Physical Exam   Constitutional: He is oriented to person, place, and time. He appears well-developed and well-nourished. He appears distressed (mild).   HENT:   Head: Normocephalic and atraumatic.   Eyes: EOM are normal. Pupils are equal, round, and reactive to light. Right eye exhibits no discharge. Left eye exhibits no discharge.   Neck: Normal range of motion. Neck supple. No tracheal deviation present.   Cardiovascular: Normal rate, regular rhythm, normal heart sounds  and intact distal pulses.  Exam reveals no gallop and no friction rub.    No murmur heard.  Pulmonary/Chest: Effort normal. No respiratory distress. He has no wheezes. He has no rales. He exhibits no tenderness.   Abdominal: Soft. He exhibits no distension and no mass. There is no tenderness. There is no rebound and no guarding. No hernia.   Musculoskeletal: Normal range of motion. He exhibits no edema or deformity.   Neurological: He is alert and oriented to person, place, and time. No cranial nerve deficit.   Skin: Skin is warm and dry. No rash noted. No erythema.   Psychiatric: He has a normal mood and affect. His behavior is normal. Judgment and thought content normal.   Nursing note and vitals reviewed.    EKG           EKG time/Interp time: 0013/0014  Rhythm/Rate: Normal sinus rhythm, 83 bpm  P waves and ND: P waves present, 192 ms  QRS, axis: 140 ms, left anterior fascicular block, left axis   ST and T waves: No acute-appearing ischemic changes evident     Independently interpreted by me contemporaneously with treatment      Results for orders placed or performed during the hospital encounter of 01/03/17   Comprehensive Metabolic Panel   Result Value Ref Range    Glucose 99 65 - 99 mg/dL    BUN 17 8 - 23 mg/dL    Creatinine 1.19 0.76 - 1.27 mg/dL    Sodium 136 136 - 145 mmol/L    Potassium 4.0 3.5 - 5.2 mmol/L    Chloride 98 98 - 107 mmol/L    CO2 21.4 (L) 22.0 - 29.0 mmol/L    Calcium 9.2 8.8 - 10.5 mg/dL    Total Protein 6.9 6.0 - 8.5 g/dL    Albumin 3.60 3.50 - 5.20 g/dL    ALT (SGPT) 16 5 - 41 U/L    AST (SGOT) 15 5 - 40 U/L    Alkaline Phosphatase 90 40 - 129 U/L    Total Bilirubin 0.7 0.2 - 1.2 mg/dL    eGFR Non African Amer 62 >60 mL/min/1.73    Globulin 3.3 gm/dL    A/G Ratio 1.1 g/dL    BUN/Creatinine Ratio 14.3 7.0 - 25.0    Anion Gap 16.6 mmol/L   Protime-INR   Result Value Ref Range    Protime 14.1 12.1 - 15.0 Seconds    INR 1.12 (H) 0.90 - 1.10   Troponin   Result Value Ref Range    Troponin T  0.012 0.000 - 0.030 ng/mL   CBC Auto Differential   Result Value Ref Range    WBC 10.31 4.80 - 10.80 10*3/mm3    RBC 4.55 (L) 4.70 - 6.10 10*6/mm3    Hemoglobin 13.2 (L) 14.0 - 18.0 g/dL    Hematocrit 40.0 (L) 42.0 - 52.0 %    MCV 87.9 80.0 - 94.0 fL    MCH 29.0 27.0 - 31.0 pg    MCHC 33.0 31.0 - 37.0 g/dL    RDW 15.4 (H) 11.5 - 14.5 %    RDW-SD 49.4 37.0 - 54.0 fl    MPV 11.5 (H) 7.4 - 10.4 fL    Platelets 234 140 - 500 10*3/mm3    Neutrophil % 78.6 (H) 45.0 - 70.0 %    Lymphocyte % 11.3 (L) 20.0 - 45.0 %    Monocyte % 6.4 3.0 - 8.0 %    Eosinophil % 2.3 0.0 - 4.0 %    Basophil % 1.1 0.0 - 2.0 %    Immature Grans % 0.3 0.0 - 0.5 %    Neutrophils, Absolute 8.11 1.50 - 8.30 10*3/mm3    Lymphocytes, Absolute 1.16 0.60 - 4.80 10*3/mm3    Monocytes, Absolute 0.66 0.00 - 1.00 10*3/mm3    Eosinophils, Absolute 0.24 0.10 - 0.30 10*3/mm3    Basophils, Absolute 0.11 0.00 - 0.20 10*3/mm3    Immature Grans, Absolute 0.03 0.00 - 0.03 10*3/mm3    nRBC 0.0 0.0 - 0.0 /100 WBC   D-dimer, Quantitative   Result Value Ref Range    D-Dimer, Quantitative 0.47 (H) 0.00 - 0.46 MCGFEU/mL   Troponin   Result Value Ref Range    Troponin T <0.010 0.000 - 0.030 ng/mL       RADIOLOGY        Study: Chest x-ray    Findings: Right lower lobe opacity.  Heart and mediastinum appear normal    Interpreted Contemporaneously by myself, independently viewed by me    RADIOLOGY        Study: CT chest    Findings: Right lower lobe airspace opacity, likely a pneumonia.  Triangular configuration does raise possibility of pulmonary infarct.  However this is less likely.  Emphysema.  Enlarged mediastinal lymph nodes may be reactive.  However this should be followed.  Trace right pleural effusion.    Interpreted Contemporaneously by Dr. dye, independently viewed by me          Procedures         ED Course  ED Course   Comment By Time   Pt observed for several hours in ED.  Says pain is better after asa and toradol but keeps returning occasionally.  I have  requested records from [place] to learn more about his cardiac hx, but so far they have not been received at our facility.  His self-reported cardiac hx is s/w worrisome and it is unclear whether this new chest pain pattern tonight is heart related vs. Pleuritic in nature.  He has an obvious pneumonia on the right side but his pain sx's are only on the left side.  Contrasted CT is unavailable d/t his allergy rxn history.  Given these multiple confounding variables, i will plan to observe him today for further studies as needed.  Started tx for pneumonia with Rocephin and Azithro.   Rajat Orozco MD 01/03 0718            HEART Score  History: Moderately suspicious (+1)  ECG: Non specific repolarization disturbance (+1)  Age: 45 through 65 (+1)  Risk Factors: 3 or more risk factors OR history of atherosclerotic disease (+2)  Troponin: Normal limit or lower (+0)  Total: 5         MDM  Number of Diagnoses or Management Options  Diagnosis management comments: My differential diagnosis for chest pain includes but is not limited to:  Muscle strain, costochondritis, myositis, pleurisy, rib fracture, intercostal neuritis, herpes zoster, tumor, myocardial infarction, coronary syndrome, unstable angina, angina, aortic dissection, mitral valve prolapse, pericarditis, palpitations, pulmonary embolus, pneumonia, pneumothorax, lung cancer, GERD, esophagitis, esophageal spasm       Amount and/or Complexity of Data Reviewed  Clinical lab tests: reviewed and ordered  Tests in the radiology section of CPT®: reviewed and ordered  Decide to obtain previous medical records or to obtain history from someone other than the patient: yes (Requested records faxed from [place])  Independent visualization of images, tracings, or specimens: yes    Risk of Complications, Morbidity, and/or Mortality  Presenting problems: high  Diagnostic procedures: moderate  Management options: moderate        Final diagnoses:   Chest pain in  adult   Pneumonia of right lower lobe due to infectious organism            Rajat Orozco MD  01/03/17 0718

## 2017-01-04 LAB
ALBUMIN SERPL-MCNC: 3.4 G/DL (ref 3.5–5.2)
ALBUMIN/GLOB SERPL: 1.3 G/DL
ALP SERPL-CCNC: 77 U/L (ref 40–129)
ALT SERPL W P-5'-P-CCNC: 11 U/L (ref 5–41)
ANION GAP SERPL CALCULATED.3IONS-SCNC: 13.4 MMOL/L
AST SERPL-CCNC: 12 U/L (ref 5–40)
BASOPHILS # BLD AUTO: 0.1 10*3/MM3 (ref 0–0.2)
BASOPHILS NFR BLD AUTO: 1.5 % (ref 0–2)
BILIRUB SERPL-MCNC: 0.6 MG/DL (ref 0.2–1.2)
BUN BLD-MCNC: 15 MG/DL (ref 8–23)
BUN/CREAT SERPL: 14.9 (ref 7–25)
CALCIUM SPEC-SCNC: 8.4 MG/DL (ref 8.8–10.5)
CHLORIDE SERPL-SCNC: 106 MMOL/L (ref 98–107)
CO2 SERPL-SCNC: 18.6 MMOL/L (ref 22–29)
CREAT BLD-MCNC: 1.01 MG/DL (ref 0.76–1.27)
D-LACTATE SERPL-SCNC: 1.4 MMOL/L (ref 0.5–2)
DEPRECATED RDW RBC AUTO: 52 FL (ref 37–54)
EOSINOPHIL # BLD AUTO: 0.2 10*3/MM3 (ref 0.1–0.3)
EOSINOPHIL NFR BLD AUTO: 3.1 % (ref 0–4)
ERYTHROCYTE [DISTWIDTH] IN BLOOD BY AUTOMATED COUNT: 15.8 % (ref 11.5–14.5)
GFR SERPL CREATININE-BSD FRML MDRD: 75 ML/MIN/1.73
GLOBULIN UR ELPH-MCNC: 2.6 GM/DL
GLUCOSE BLD-MCNC: 101 MG/DL (ref 65–99)
HCT VFR BLD AUTO: 37.7 % (ref 42–52)
HGB BLD-MCNC: 12.2 G/DL (ref 14–18)
IMM GRANULOCYTES # BLD: 0.02 10*3/MM3 (ref 0–0.03)
IMM GRANULOCYTES NFR BLD: 0.3 % (ref 0–0.5)
LYMPHOCYTES # BLD AUTO: 1.04 10*3/MM3 (ref 0.6–4.8)
LYMPHOCYTES NFR BLD AUTO: 15.9 % (ref 20–45)
MCH RBC QN AUTO: 29.2 PG (ref 27–31)
MCHC RBC AUTO-ENTMCNC: 32.4 G/DL (ref 31–37)
MCV RBC AUTO: 90.2 FL (ref 80–94)
MONOCYTES # BLD AUTO: 0.38 10*3/MM3 (ref 0–1)
MONOCYTES NFR BLD AUTO: 5.8 % (ref 3–8)
NEUTROPHILS # BLD AUTO: 4.8 10*3/MM3 (ref 1.5–8.3)
NEUTROPHILS NFR BLD AUTO: 73.4 % (ref 45–70)
NRBC BLD MANUAL-RTO: 0 /100 WBC (ref 0–0)
PLATELET # BLD AUTO: 184 10*3/MM3 (ref 140–500)
PMV BLD AUTO: 11.4 FL (ref 7.4–10.4)
POTASSIUM BLD-SCNC: 4.2 MMOL/L (ref 3.5–5.2)
PROT SERPL-MCNC: 6 G/DL (ref 6–8.5)
RBC # BLD AUTO: 4.18 10*6/MM3 (ref 4.7–6.1)
SODIUM BLD-SCNC: 138 MMOL/L (ref 136–145)
WBC NRBC COR # BLD: 6.54 10*3/MM3 (ref 4.8–10.8)

## 2017-01-04 PROCEDURE — 94640 AIRWAY INHALATION TREATMENT: CPT

## 2017-01-04 PROCEDURE — 99232 SBSQ HOSP IP/OBS MODERATE 35: CPT | Performed by: INTERNAL MEDICINE

## 2017-01-04 PROCEDURE — 25010000002 CEFTRIAXONE PER 250 MG: Performed by: INTERNAL MEDICINE

## 2017-01-04 PROCEDURE — 25010000002 AZITHROMYCIN PER 500 MG: Performed by: INTERNAL MEDICINE

## 2017-01-04 PROCEDURE — 83605 ASSAY OF LACTIC ACID: CPT | Performed by: INTERNAL MEDICINE

## 2017-01-04 PROCEDURE — 85025 COMPLETE CBC W/AUTO DIFF WBC: CPT | Performed by: INTERNAL MEDICINE

## 2017-01-04 PROCEDURE — 94799 UNLISTED PULMONARY SVC/PX: CPT

## 2017-01-04 PROCEDURE — 80053 COMPREHEN METABOLIC PANEL: CPT | Performed by: INTERNAL MEDICINE

## 2017-01-04 PROCEDURE — 25010000002 ENOXAPARIN PER 10 MG: Performed by: INTERNAL MEDICINE

## 2017-01-04 RX ADMIN — ACETAMINOPHEN 650 MG: 325 TABLET, FILM COATED ORAL at 10:02

## 2017-01-04 RX ADMIN — SODIUM CHLORIDE 125 ML/HR: 9 INJECTION, SOLUTION INTRAVENOUS at 02:37

## 2017-01-04 RX ADMIN — IPRATROPIUM BROMIDE AND ALBUTEROL SULFATE 3 ML: .5; 3 SOLUTION RESPIRATORY (INHALATION) at 11:55

## 2017-01-04 RX ADMIN — SODIUM CHLORIDE 125 ML/HR: 9 INJECTION, SOLUTION INTRAVENOUS at 22:09

## 2017-01-04 RX ADMIN — IPRATROPIUM BROMIDE AND ALBUTEROL SULFATE 3 ML: .5; 3 SOLUTION RESPIRATORY (INHALATION) at 07:50

## 2017-01-04 RX ADMIN — IPRATROPIUM BROMIDE AND ALBUTEROL SULFATE 3 ML: .5; 3 SOLUTION RESPIRATORY (INHALATION) at 16:04

## 2017-01-04 RX ADMIN — CLOPIDOGREL BISULFATE 75 MG: 75 TABLET, FILM COATED ORAL at 09:50

## 2017-01-04 RX ADMIN — SODIUM CHLORIDE 500 MG: 900 INJECTION, SOLUTION INTRAVENOUS at 04:25

## 2017-01-04 RX ADMIN — ASPIRIN 81 MG: 81 TABLET, COATED ORAL at 09:50

## 2017-01-04 RX ADMIN — IPRATROPIUM BROMIDE AND ALBUTEROL SULFATE 3 ML: .5; 3 SOLUTION RESPIRATORY (INHALATION) at 19:44

## 2017-01-04 RX ADMIN — ATORVASTATIN CALCIUM 20 MG: 20 TABLET, FILM COATED ORAL at 22:04

## 2017-01-04 RX ADMIN — ENOXAPARIN SODIUM 40 MG: 40 INJECTION SUBCUTANEOUS at 22:04

## 2017-01-04 RX ADMIN — CEFTRIAXONE 1 G: 1 INJECTION, SOLUTION INTRAVENOUS at 06:06

## 2017-01-04 RX ADMIN — SODIUM CHLORIDE 125 ML/HR: 9 INJECTION, SOLUTION INTRAVENOUS at 13:54

## 2017-01-04 NOTE — PLAN OF CARE
Problem: Patient Care Overview (Adult)  Goal: Plan of Care Review  Outcome: Ongoing (interventions implemented as appropriate)    01/03/17 2246   Coping/Psychosocial Response Interventions   Plan Of Care Reviewed With patient   Patient Care Overview   Progress improving       Goal: Adult Individualization and Mutuality  Outcome: Ongoing (interventions implemented as appropriate)    Problem: Pneumonia (Adult)  Intervention: Maximize Oxygenation/Ventilation/Perfusion    01/03/17 2246   Respiratory Interventions   Airway/Ventilation Management airway patency maintained;pulmonary hygiene promoted   Promote Aggressive Pulmonary Hygiene/Secretion Management   Cough And Deep Breathing done independently per patient   Positioning   Head Of Bed (HOB) Position HOB elevated   Activity   Activity Type activity adjusted per tolerance

## 2017-01-05 LAB
ALBUMIN SERPL-MCNC: 3.2 G/DL (ref 3.5–5.2)
ALBUMIN/GLOB SERPL: 1.1 G/DL
ALP SERPL-CCNC: 83 U/L (ref 40–129)
ALT SERPL W P-5'-P-CCNC: 13 U/L (ref 5–41)
ANION GAP SERPL CALCULATED.3IONS-SCNC: 14 MMOL/L
AST SERPL-CCNC: 14 U/L (ref 5–40)
BACTERIA SPEC RESP CULT: NORMAL
BASOPHILS # BLD AUTO: 0.08 10*3/MM3 (ref 0–0.2)
BASOPHILS NFR BLD AUTO: 1.3 % (ref 0–2)
BILIRUB SERPL-MCNC: 0.8 MG/DL (ref 0.2–1.2)
BUN BLD-MCNC: 12 MG/DL (ref 8–23)
BUN/CREAT SERPL: 11.3 (ref 7–25)
CALCIUM SPEC-SCNC: 8.9 MG/DL (ref 8.8–10.5)
CHLORIDE SERPL-SCNC: 108 MMOL/L (ref 98–107)
CO2 SERPL-SCNC: 17 MMOL/L (ref 22–29)
CREAT BLD-MCNC: 1.06 MG/DL (ref 0.76–1.27)
D-LACTATE SERPL-SCNC: 1.4 MMOL/L (ref 0.5–2)
DEPRECATED RDW RBC AUTO: 52.7 FL (ref 37–54)
EOSINOPHIL # BLD AUTO: 0.19 10*3/MM3 (ref 0.1–0.3)
EOSINOPHIL NFR BLD AUTO: 3.1 % (ref 0–4)
ERYTHROCYTE [DISTWIDTH] IN BLOOD BY AUTOMATED COUNT: 16 % (ref 11.5–14.5)
GFR SERPL CREATININE-BSD FRML MDRD: 71 ML/MIN/1.73
GLOBULIN UR ELPH-MCNC: 3 GM/DL
GLUCOSE BLD-MCNC: 108 MG/DL (ref 65–99)
GRAM STN SPEC: NORMAL
HCT VFR BLD AUTO: 38.1 % (ref 42–52)
HGB BLD-MCNC: 12.1 G/DL (ref 14–18)
IMM GRANULOCYTES # BLD: 0.03 10*3/MM3 (ref 0–0.03)
IMM GRANULOCYTES NFR BLD: 0.5 % (ref 0–0.5)
LYMPHOCYTES # BLD AUTO: 0.47 10*3/MM3 (ref 0.6–4.8)
LYMPHOCYTES NFR BLD AUTO: 7.7 % (ref 20–45)
MCH RBC QN AUTO: 28.7 PG (ref 27–31)
MCHC RBC AUTO-ENTMCNC: 31.8 G/DL (ref 31–37)
MCV RBC AUTO: 90.5 FL (ref 80–94)
MONOCYTES # BLD AUTO: 0.22 10*3/MM3 (ref 0–1)
MONOCYTES NFR BLD AUTO: 3.6 % (ref 3–8)
NEUTROPHILS # BLD AUTO: 5.12 10*3/MM3 (ref 1.5–8.3)
NEUTROPHILS NFR BLD AUTO: 83.8 % (ref 45–70)
NRBC BLD MANUAL-RTO: 0 /100 WBC (ref 0–0)
PLATELET # BLD AUTO: 203 10*3/MM3 (ref 140–500)
PMV BLD AUTO: 11.8 FL (ref 7.4–10.4)
POTASSIUM BLD-SCNC: 4.7 MMOL/L (ref 3.5–5.2)
PROT SERPL-MCNC: 6.2 G/DL (ref 6–8.5)
RBC # BLD AUTO: 4.21 10*6/MM3 (ref 4.7–6.1)
SODIUM BLD-SCNC: 139 MMOL/L (ref 136–145)
WBC NRBC COR # BLD: 6.11 10*3/MM3 (ref 4.8–10.8)

## 2017-01-05 PROCEDURE — 25010000002 CEFTRIAXONE PER 250 MG: Performed by: INTERNAL MEDICINE

## 2017-01-05 PROCEDURE — 25010000002 AZITHROMYCIN PER 500 MG: Performed by: INTERNAL MEDICINE

## 2017-01-05 PROCEDURE — 25010000002 DIPHENHYDRAMINE PER 50 MG

## 2017-01-05 PROCEDURE — 99232 SBSQ HOSP IP/OBS MODERATE 35: CPT | Performed by: HOSPITALIST

## 2017-01-05 PROCEDURE — 25010000002 ENOXAPARIN PER 10 MG: Performed by: INTERNAL MEDICINE

## 2017-01-05 PROCEDURE — 83605 ASSAY OF LACTIC ACID: CPT | Performed by: INTERNAL MEDICINE

## 2017-01-05 PROCEDURE — 85025 COMPLETE CBC W/AUTO DIFF WBC: CPT | Performed by: INTERNAL MEDICINE

## 2017-01-05 PROCEDURE — 94640 AIRWAY INHALATION TREATMENT: CPT

## 2017-01-05 PROCEDURE — 25010000002 METHYLPREDNISOLONE PER 40 MG

## 2017-01-05 PROCEDURE — 80053 COMPREHEN METABOLIC PANEL: CPT | Performed by: INTERNAL MEDICINE

## 2017-01-05 PROCEDURE — 63710000001 PREDNISONE PER 5 MG: Performed by: INTERNAL MEDICINE

## 2017-01-05 RX ORDER — LEVOFLOXACIN 750 MG/1
750 TABLET ORAL EVERY 24 HOURS
Status: DISCONTINUED | OUTPATIENT
Start: 2017-01-05 | End: 2017-01-06 | Stop reason: HOSPADM

## 2017-01-05 RX ORDER — LEVOFLOXACIN 500 MG/1
TABLET, FILM COATED ORAL
Status: COMPLETED
Start: 2017-01-05 | End: 2017-01-05

## 2017-01-05 RX ORDER — METHYLPREDNISOLONE SODIUM SUCCINATE 40 MG/ML
INJECTION, POWDER, LYOPHILIZED, FOR SOLUTION INTRAMUSCULAR; INTRAVENOUS
Status: COMPLETED
Start: 2017-01-05 | End: 2017-01-05

## 2017-01-05 RX ORDER — DIPHENHYDRAMINE HCL 50 MG
50 CAPSULE ORAL EVERY 6 HOURS PRN
Status: DISCONTINUED | OUTPATIENT
Start: 2017-01-05 | End: 2017-01-06 | Stop reason: HOSPADM

## 2017-01-05 RX ORDER — PREDNISONE 10 MG/1
10 TABLET ORAL DAILY
Status: DISCONTINUED | OUTPATIENT
Start: 2017-01-11 | End: 2017-01-06 | Stop reason: HOSPADM

## 2017-01-05 RX ORDER — SODIUM CHLORIDE 9 MG/ML
INJECTION, SOLUTION INTRAVENOUS
Status: COMPLETED
Start: 2017-01-05 | End: 2017-01-05

## 2017-01-05 RX ORDER — DIPHENHYDRAMINE HYDROCHLORIDE 50 MG/ML
INJECTION INTRAMUSCULAR; INTRAVENOUS
Status: COMPLETED
Start: 2017-01-05 | End: 2017-01-05

## 2017-01-05 RX ORDER — PREDNISONE 1 MG/1
5 TABLET ORAL DAILY
Status: DISCONTINUED | OUTPATIENT
Start: 2017-01-14 | End: 2017-01-06 | Stop reason: HOSPADM

## 2017-01-05 RX ORDER — PREDNISONE 20 MG/1
20 TABLET ORAL DAILY
Status: DISCONTINUED | OUTPATIENT
Start: 2017-01-05 | End: 2017-01-06 | Stop reason: HOSPADM

## 2017-01-05 RX ORDER — LEVOFLOXACIN 5 MG/ML
INJECTION, SOLUTION INTRAVENOUS
Status: DISCONTINUED
Start: 2017-01-05 | End: 2017-01-05 | Stop reason: WASHOUT

## 2017-01-05 RX ORDER — METHYLPREDNISOLONE SODIUM SUCCINATE 125 MG/2ML
60 INJECTION, POWDER, LYOPHILIZED, FOR SOLUTION INTRAMUSCULAR; INTRAVENOUS ONCE
Status: COMPLETED | OUTPATIENT
Start: 2017-01-05 | End: 2017-01-05

## 2017-01-05 RX ADMIN — LEVOFLOXACIN 750 MG: 750 TABLET ORAL at 22:10

## 2017-01-05 RX ADMIN — CEFTRIAXONE 1 G: 1 INJECTION, SOLUTION INTRAVENOUS at 00:32

## 2017-01-05 RX ADMIN — ATORVASTATIN CALCIUM 20 MG: 20 TABLET, FILM COATED ORAL at 20:11

## 2017-01-05 RX ADMIN — IPRATROPIUM BROMIDE AND ALBUTEROL SULFATE 3 ML: .5; 3 SOLUTION RESPIRATORY (INHALATION) at 07:33

## 2017-01-05 RX ADMIN — SODIUM CHLORIDE 125 ML/HR: 9 INJECTION, SOLUTION INTRAVENOUS at 06:28

## 2017-01-05 RX ADMIN — SODIUM CHLORIDE 1000 ML: 9 INJECTION, SOLUTION INTRAVENOUS at 13:55

## 2017-01-05 RX ADMIN — CLOPIDOGREL BISULFATE 75 MG: 75 TABLET, FILM COATED ORAL at 09:51

## 2017-01-05 RX ADMIN — LEVOFLOXACIN 750 MG: 500 TABLET, FILM COATED ORAL at 22:10

## 2017-01-05 RX ADMIN — IPRATROPIUM BROMIDE AND ALBUTEROL SULFATE 3 ML: .5; 3 SOLUTION RESPIRATORY (INHALATION) at 15:45

## 2017-01-05 RX ADMIN — METHYLPREDNISOLONE SODIUM SUCCINATE 60 MG: 40 INJECTION, POWDER, FOR SOLUTION INTRAMUSCULAR; INTRAVENOUS at 04:53

## 2017-01-05 RX ADMIN — DIPHENHYDRAMINE HYDROCHLORIDE 50 MG: 50 INJECTION, SOLUTION INTRAMUSCULAR; INTRAVENOUS at 04:54

## 2017-01-05 RX ADMIN — PREDNISONE 20 MG: 20 TABLET ORAL at 10:02

## 2017-01-05 RX ADMIN — METHYLPREDNISOLONE SODIUM SUCCINATE 60 MG: 125 INJECTION, POWDER, LYOPHILIZED, FOR SOLUTION INTRAMUSCULAR; INTRAVENOUS at 04:53

## 2017-01-05 RX ADMIN — SODIUM CHLORIDE 500 MG: 900 INJECTION, SOLUTION INTRAVENOUS at 04:20

## 2017-01-05 RX ADMIN — DOCUSATE SODIUM 100 MG: 100 CAPSULE, LIQUID FILLED ORAL at 09:51

## 2017-01-05 RX ADMIN — SODIUM CHLORIDE 125 ML/HR: 9 INJECTION, SOLUTION INTRAVENOUS at 13:56

## 2017-01-05 RX ADMIN — ACETAMINOPHEN 650 MG: 325 TABLET, FILM COATED ORAL at 22:06

## 2017-01-05 RX ADMIN — ENOXAPARIN SODIUM 40 MG: 40 INJECTION SUBCUTANEOUS at 20:11

## 2017-01-05 RX ADMIN — ASPIRIN 81 MG: 81 TABLET, COATED ORAL at 09:51

## 2017-01-05 RX ADMIN — IPRATROPIUM BROMIDE AND ALBUTEROL SULFATE 3 ML: .5; 3 SOLUTION RESPIRATORY (INHALATION) at 21:26

## 2017-01-05 RX ADMIN — IPRATROPIUM BROMIDE AND ALBUTEROL SULFATE 3 ML: .5; 3 SOLUTION RESPIRATORY (INHALATION) at 11:25

## 2017-01-05 NOTE — PLAN OF CARE
"Problem: Patient Care Overview (Adult)  Goal: Plan of Care Review  Outcome: Ongoing (interventions implemented as appropriate)    01/05/17 1413   Coping/Psychosocial Response Interventions   Plan Of Care Reviewed With patient   Patient Care Overview   Progress progress toward functional goals as expected   Outcome Evaluation   Outcome Summary/Follow up Plan feeling better, sight SOA with AD\"L , remains on room air       Goal: Adult Individualization and Mutuality  Outcome: Ongoing (interventions implemented as appropriate)  Goal: Discharge Needs Assessment  Outcome: Ongoing (interventions implemented as appropriate)    Problem: Pneumonia (Adult)  Goal: Signs and Symptoms of Listed Potential Problems Will be Absent or Manageable (Pneumonia)  Outcome: Outcome(s) achieved Date Met:  01/05/17    Problem: Pain, Acute (Adult)  Goal: Identify Related Risk Factors and Signs and Symptoms  Outcome: Outcome(s) achieved Date Met:  01/05/17  Goal: Acceptable Pain Control/Comfort Level  Outcome: Outcome(s) achieved Date Met:  01/05/17      "

## 2017-01-05 NOTE — PLAN OF CARE
Problem: Patient Care Overview (Adult)  Goal: Plan of Care Review  Outcome: Ongoing (interventions implemented as appropriate)    01/05/17 0128   Coping/Psychosocial Response Interventions   Plan Of Care Reviewed With patient   Patient Care Overview   Progress improving       Goal: Adult Individualization and Mutuality  Outcome: Ongoing (interventions implemented as appropriate)    Problem: Pneumonia (Adult)  Intervention: Maximize Oxygenation/Ventilation/Perfusion    01/05/17 0128   Respiratory Interventions   Airway/Ventilation Management airway patency maintained   Promote Aggressive Pulmonary Hygiene/Secretion Management   Cough And Deep Breathing done independently per patient   Positioning   Head Of Bed (HOB) Position HOB elevated   Activity   Activity Type activity adjusted per tolerance

## 2017-01-05 NOTE — NURSING NOTE
Administered azithromycin at 0420; patient called out with shortness of air, itching and swelling of tongue at 0441.  MD notified and ordered iv benadryl and iv solumedrol.  meds administered and patient closely monitored.

## 2017-01-05 NOTE — PROGRESS NOTES
"    HOSPITALIST SERVICES  @ [facility] [place], [place]            HOSPITALIST TEAM   PROGRESS NOTE      Patient Care Team:  No Known Provider as PCP - General        Chief Complaint:        Lt sided chest pain while patient was in bed       Subjective    Mr. Mario Nicholson is a 61 year old  male who is known to have HTN, Hyperlipidemia, CAD/MI, S/P CABG and Status quo cardiac defibrillator, who presented to ED via ambulance for evaluation of new onset chest pains. He has a hx of CAD with an MI in 2016 that required stenting and subsequent defibrillator implantation.       Interval History and ROS:     Patient States chest pain improved; denies productive cough  Patient Complaints: No new complaints  Patient Denies:  Any sx of fever, shortness of breath, abdominal pain or n/v  History taken from: Patient      Objective    Vital Signs  Temp:  [97 °F (36.1 °C)-98.8 °F (37.1 °C)] 98.8 °F (37.1 °C)  Heart Rate:  [67-99] 68  Resp:  [18-20] 20  BP: ()/(55-67) 98/60    Flowsheet Rows         First Filed Value    Admission Height  71\" (180.3 cm) Documented at 01/03/2017 0059    Admission Weight  177 lb (80.3 kg) Documented at 01/03/2017 0059              Physical Exam:      PHYSICAL EXAMINATION:    VITAL SIGNS: As per Nurse's notes    GENERAL APPEARANCE: The patient is a well developed, well nourished,  male, in no acute distress.     HEENT: Normocephalic, atraumatic. PERRL. The sclerae anicteric and conjunctivae pink and moist.    NECK: Supple and symmetric. No masses. No thyromegaly. No carotid bruits. No evidence of JVD.    SKIN: Warm, dry and intact. No rash or lesions or wounds or patechiae.    LUNGS: Clear to auscultation bilaterally.     CARDIOVASCULAR: RRR. S1, S2 normal without murmur/gallop/rub.     ABDOMEN: Soft, non-tender, non-distended. No masses. No rebound/guarding.     EXTREMITIES:  No evidence of cyanosis, clubbing, or edema.     MUSCULOSKELETAL: Unremarkable except left chest wall " pain. Muscle strength and tone normal.    PSYCHIATRY: Responds appropriately to questions. No evidence of acute anxiety, depression, panic attacks or hallucinations.    MENTAL STATUS EXAMINATION: Affect and insight appropriate. Speech and behavior are normal. Patient is alert and oriented x3. Thought Process WNL.     NEUROLOGIC: Examination is grossly intact globally with no focal deficits.         Results Review:     I reviewed the patient's new clinical results.    Lab Results (last 24 hours)     Procedure Component Value Units Date/Time    Respiratory Panel, PCR [[id number]]  (Normal) Collected:  01/03/17 1351    Specimen:  Swab from Nares Updated:  01/03/17 2258     ADENOVIRUS, PCR Not Detected      Coronavirus 229E Not Detected      Coronavirus HKU1 Not Detected      Coronavirus NL63 Not Detected      Coronavirus OC43 Not Detected      Human Metapneumovirus Not Detected      Human Rhinovirus/Enterovirus Not Detected      Influenza B PCR Not Detected      Parainfluenza Virus 1 Not Detected      Parainfluenza Virus 2 Not Detected      Parainfluenza Virus 3 Not Detected      Parainfluenza Virus 4 Not Detected      Bordetella pertussis pcr Not Detected      Influenza 2009 H1N1 by PCR Not Detected      Chlamydophila pneumoniae PCR Not Detected      Mycoplasma pneumo by PCR Not Detected      Influenza A PCR Not Detected      Influenza A H3 Not Detected      Influenza A H1 Not Detected      RSV, PCR Not Detected     Blood Culture [[id number]]  (Normal) Collected:  01/03/17 0533    Specimen:  Blood from Arm, Right Updated:  01/04/17 0601     Blood Culture No growth at 24 hours     Blood Culture [[id number]]  (Normal) Collected:  01/03/17 0533    Specimen:  Blood from Arm, Left Updated:  01/04/17 0601     Blood Culture No growth at 24 hours     CBC & Differential [[id number]] Collected:  01/04/17 0552    Specimen:  Blood Updated:  01/04/17 0612    Narrative:       The following orders were created for panel order CBC &  Differential.  Procedure                               Abnormality         Status                     ---------                               -----------         ------                     CBC Auto Differential[[id number]]         Abnormal            Final result                 Please view results for these tests on the individual orders.    CBC Auto Differential [[id number]]  (Abnormal) Collected:  01/04/17 0552    Specimen:  Blood Updated:  01/04/17 0612     WBC 6.54 10*3/mm3      RBC 4.18 (L) 10*6/mm3      Hemoglobin 12.2 (L) g/dL      Hematocrit 37.7 (L) %      MCV 90.2 fL      MCH 29.2 pg      MCHC 32.4 g/dL      RDW 15.8 (H) %      RDW-SD 52.0 fl      MPV 11.4 (H) fL      Platelets 184 10*3/mm3      Neutrophil % 73.4 (H) %      Lymphocyte % 15.9 (L) %      Monocyte % 5.8 %      Eosinophil % 3.1 %      Basophil % 1.5 %      Immature Grans % 0.3 %      Neutrophils, Absolute 4.80 10*3/mm3      Lymphocytes, Absolute 1.04 10*3/mm3      Monocytes, Absolute 0.38 10*3/mm3      Eosinophils, Absolute 0.20 10*3/mm3      Basophils, Absolute 0.10 10*3/mm3      Immature Grans, Absolute 0.02 10*3/mm3      nRBC 0.0 /100 WBC     Comprehensive Metabolic Panel [[id number]]  (Abnormal) Collected:  01/04/17 0552    Specimen:  Blood Updated:  01/04/17 0642     Glucose 101 (H) mg/dL      BUN 15 mg/dL      Creatinine 1.01 mg/dL      Sodium 138 mmol/L      Potassium 4.2 mmol/L      Chloride 106 mmol/L      CO2 18.6 (L) mmol/L      Calcium 8.4 (L) mg/dL      Total Protein 6.0 g/dL      Albumin 3.40 (L) g/dL      ALT (SGPT) 11 U/L      AST (SGOT) 12 U/L      Alkaline Phosphatase 77 U/L      Total Bilirubin 0.6 mg/dL      eGFR Non African Amer 75 mL/min/1.73      Globulin 2.6 gm/dL      A/G Ratio 1.3 g/dL      BUN/Creatinine Ratio 14.9      Anion Gap 13.4 mmol/L     Lactic Acid, Plasma [[id number]]  (Normal) Collected:  01/04/17 0552    Specimen:  Blood Updated:  01/04/17 0642     Lactate 1.4 mmol/L     Respiratory Culture [[id number]] Collected:   01/03/17 1623    Specimen:  Sputum from Cough Updated:  01/04/17 1140     Respiratory Culture Scant growth (1+) Normal Respiratory Camille      Gram Stain Result        Moderate (3+) Mixed bacterial morphotypes seen on Gram Stain          Imaging Results (last 24 hours)     ** No results found for the last 24 hours. **          Xray not reviewed personally by physician.      ECG not reviewed personally by physician  ECG/EMG Results (most recent)     Procedure Component Value Units Date/Time    ECG 12 Lead [[id number]] Collected:  01/03/17 0013     Updated:  01/03/17 1011    Narrative:       RR Interval= 723 ms  NV Interval= 192 ms  QRSD Interval= 120 ms  QT Interval= 380 ms  QTc Interval= 447 ms  Heart Rate= 83 ms  P Axis= 44 deg  QRS Axis= -82 deg  T Wave Axis= 83 deg  I: 40 Axis= 147 deg  T: 40 Axis= -72 deg  ST Axis= 63 deg  SINUS RHYTHM  PAIRED VENTRICULAR PREMATURE COMPLEXES vs artifact  PROBABLE LEFT ATRIAL ABNORMALITY  LEFT ANTERIOR FASCICULAR BLOCK  LEFT VENTRICULAR HYPERTROPHY  ANTERIOR Q WAVES, POSSIBLY DUE TO LVH  NO PRIOR TRACING AVAILABLE FOR COMPARISON  Electronically Signed by:  Cole Adames ([facility]) 03-Jan-2017 10:09:41  Date and Time of Study: 2017-01-03 00:13:20          Medication Review:   I have reviewed the patient's current medication list    Current Facility-Administered Medications:   •  acetaminophen (TYLENOL) tablet 650 mg, 650 mg, Oral, Q6H PRN, Jamin Nixon MD, 650 mg at 01/04/17 1002  •  aspirin EC tablet 81 mg, 81 mg, Oral, Daily, Jamin Nixon MD, 81 mg at 01/04/17 0950  •  atorvastatin (LIPITOR) tablet 20 mg, 20 mg, Oral, Nightly, Jamin Nixon MD, 20 mg at 01/03/17 2049  •  AZITHROMYCIN 500 MG/250 ML 0.9% NS IVPB (MBP), 500 mg, Intravenous, Q24H, Jamin Nixon MD, Stopped at 01/04/17 0525  •  cefTRIAXone (ROCEPHIN) IVPB 1 g, 1 g, Intravenous, Q24H, Jamin Nixon MD, 1 g at 01/04/17 0606  •  clopidogrel (PLAVIX) tablet 75 mg, 75 mg, Oral, Daily, Jamin Nixon MD, 75 mg at  01/04/17 0950  •  docusate sodium (COLACE) capsule 100 mg, 100 mg, Oral, BID, Jamin Nixon MD, 100 mg at 01/03/17 1031  •  enoxaparin (LOVENOX) syringe 40 mg, 40 mg, Subcutaneous, Nightly, Jamin Nixon MD, 40 mg at 01/03/17 2050  •  ipratropium-albuterol (DUO-NEB) nebulizer solution 3 mL, 3 mL, Nebulization, 4x Daily - RT, Jamin Nixon MD, 3 mL at 01/04/17 1604  •  nitroglycerin (NITROSTAT) SL tablet 0.4 mg, 0.4 mg, Sublingual, Q5 Min PRN, Jamin Nixon MD  •  Insert peripheral IV, , , Once **AND** sodium chloride 0.9 % flush 10 mL, 10 mL, Intravenous, PRN, Rajat Orozco MD  •  sodium chloride 0.9 % infusion, 125 mL/hr, Intravenous, Continuous, Jamin Nixon MD, Last Rate: 125 mL/hr at 01/04/17 1354, 125 mL/hr at 01/04/17 1354      Assessment/Plan       ASSESSMENT AND PLAN:       SUMMARY:    ?   PROPHYLAXIS:   -DVT Prophylaxis: On Inj. Lovenox   -Davis Catheter: Not indicated at this time    NUTRITION AND FLUIDS:  -Diet/ Nutrition: Regular cardiac diet   -Fluid Status/Electrolytes: NS 1 L with 125 mL/Hr      SOCIAL ISSUES:    -Behavioral/ Agitation Issues: NONE   -Social Issues: Inmate at [facility].        THERAPEUTIC:    -ANTIBIOTICS: Inj. Ceftriaxone and Inj. Zithromax   -PAIN MANAGEMENT:  N/A              PRIMARY DIAGNOSES:     1) Lt sided chest pain: Patient is on serial Troponins that were negative. Patient was seen by Dr. Adames.    SINUS RHYTHM  PAIRED VENTRICULAR PREMATURE COMPLEXES vs artifact  PROBABLE LEFT ATRIAL ABNORMALITY  LEFT ANTERIOR FASCICULAR BLOCK  LEFT VENTRICULAR HYPERTROPHY  ANTERIOR Q WAVES, POSSIBLY DUE TO LVH  NO PRIOR TRACING AVAILABLE FOR COMPARISON  Electronically Signed by:  Cole Adames ([facility]) 03-Jan-2017 10:09:41  Date and Time of Study: 2017-01-03 00:13:20          Specimen Collected: 01/03/17 12:13 AM Last Resulted: 01/03/17 10:11 AM               2) RLL Pneumonia: Patient is on IV Rocephin and IV Zithromax (As per radiologist in D/D consideration is for pulmonary infarct but  it is less likely). Patient was seen by Dr. Drake     3) 5 mm noncalcified left lower lobe pulmonary nodule with Enlarged mediastinal lymph nodes: I will try to find out old CXR reparts if available. As per Dr. Drake, will repaet CT scan of chest in 4 to 6 months.       4) COPD: Hx noted. Patient is on DuoNeb     5) CAD: Hx noted     6) HTN: On Furosemide but BP was low this AM and patient is on seline infusion and last BP is 106/73     7) Hyperlipidemia: On Simvastatin     8) Former smoker with hx of 2ppd x 55 years     9) Status quo cardiac defibrillator         SECONDARY DIAGNOSES:         As above           SURGICAL DIAGNOSES:        S/P Cardiac defibrillator, S/P CABG                PLAN:    -Labs and diagnostic tests reviewed: Troponin <0.010, Gluc 101, Na 138, K 4.2, Creat 1.01, Venous Lactate 1.4, WBC 6.54, Hb 12.2, Plt 184    -Diagnostic tests reviewed:    CXR from yesterday  IMPRESSION:      1. Dense peripheral airspace disease within the right lower lobe with  small right pleural effusion.      2. Mild cardiomegaly.        This report was finalized on 1/3/2017 8:30 AM by Dr. Elizabeth Beckford MD.    EKG  SINUS RHYTHM  PAIRED VENTRICULAR PREMATURE COMPLEXES vs artifact  PROBABLE LEFT ATRIAL ABNORMALITY  LEFT ANTERIOR FASCICULAR BLOCK  LEFT VENTRICULAR HYPERTROPHY  ANTERIOR Q WAVES, POSSIBLY DUE TO LVH  NO PRIOR TRACING AVAILABLE FOR COMPARISON  Electronically Signed by:  Cole Adames ([facility]) 03-Jan-2017 10:09:41  Date and Time of Study: 2017-01-03 00:13:20          Specimen Collected: 01/03/17 12:13 AM Last Resulted: 01/03/17 10:11 AM              -Patient was seen by Dr. Adames from Cardiology:  As per Dr. Adames's note:  Assessment/Plan:          1.  Atypical chest pain -- despite his known CAD, this is NOT cardiac chest pain. It's pleuritic, reproducible with palpation, and worsens with movement of the torso. No ischemic workup is needed.    2.  Coronary artery disease, s/p anterior STEMI and ROXANA x 2 to LM/LAD.  He should remain on aspirin and clopidogrel for life. I'll change simvastatin to atorvastatin.  3.  Ischemic cardiomyopathy -- EF 25%, s/p ICD. He's euvolemic. I had his device checked today. He has seconds long NSVT but no sustained arrhythmia and no tachy-therapy has been needed. Ideally he'd be on carvedilol, but it seems his BP is too low right now. No ACE/ARB due to low BP.  4.  Pneumonia -- per primary team.        Will see as needed. He should f/u with me in office in six months.     Patient was seen by Dr. Foster. As per his note:    Assessment:  Bacterial bilateral pneumonia  5 mm noncalcified pulmonary nodule left lower lobe  History of tobacco use  Pleuritic chest pain  COPD  Coronary artery disease     Recommendations:  Send sputum for culture. We'll also check respiratory viral panel. Agree with current antibiotics of Rocephin and Zithromax. Continue bronchodilators for COPD and help with clearance.        Thank you for allowing me to participate in the care of this patient. I will continue to follow along with you.        Niall Foster MD  [facility]  Pulmonary and Critical Care Medicine     1/3/2017  12:36 PM    PLAN:  Continue antibiotics for pneumonia. Will require repeat CT with contrast for follow-up of lymphadenopathy in approximate 4-6 months. Likely reactive. 5 mm pulmonary nodule also noted on CT scan. Patient seems to be improving. Could likely switch to oral antibiotics soon.     Niall Foster MD  Pulmonary and Critical Care Medicine  [facility]  1/4/2017     10:06 AM           -Any new recommendations: Repeat CT scan Lungs in 4-6 months    -New Labs ordered: CBC and CMP and venous Lactate  in AM    -New diagnostic tests ordered: N/A    -Any changes in medications: N/A    -Discharge planning issues: Patient should be able to go back home once ready for discharge    -Placement issues: N/A    -Patient is clinically and hemodynamically stable    -To  continue current management and supportive care    -Will follow patient closely    -Nothing new to add for right now      Plan for disposition:Patient will be able to return to the care home after he is ready for discharge    Jamin Nixon MD  01/04/17  7:18 PM            Jamin Nixon M.D., [facility]  Internal Medicine/ Hospitalist          Time:       EMR Dragon/Transcription disclaimer:      Much of this encounter note is an electronic transcription/translation of spoken language to printed text. The electronic translation of spoken language may permit erroneous, or at times, nonsensical words or phrases to be inadvertently transcribed; Although I have reviewed the note for such errors, some may still exist.

## 2017-01-05 NOTE — PROGRESS NOTES
[facility] INPATIENT PROGRESS NOTE         [facility] MED SURG    1/5/2017      PATIENT IDENTIFICATION:   Name:  Mario Nicholson      MRN:  [id number]     61 y.o.  male             LOS 2    Reason for visit: Follow-up pneumonia, copd,  pulmonary nodule and pleuritic chest pain    Subjective   SUBJECTIVE:    Allergy reaction to azithromycin noted.  No worsening chest pain or cough.  Appears less short of breath today.  No distress.   Objective   OBJECTIVE:  Vitals:    01/05/17 0445 01/05/17 0653 01/05/17 0733 01/05/17 1101   BP: 116/84 100/68  127/85   BP Location: Right arm Right arm  Right arm   Patient Position: Lying Lying  Sitting   Pulse: 97 53 79 95   Resp: 26 20 20 20   Temp: 97.7 °F (36.5 °C) 97.6 °F (36.4 °C)  97.4 °F (36.3 °C)   TempSrc: Oral Oral  Oral   SpO2: 96% 96% 95% 96%   Weight:       Height:                             Body mass index is 24.25 kg/(m^2).    Intake/Output Summary (Last 24 hours) at 01/05/17 1126  Last data filed at 01/05/17 0626   Gross per 24 hour   Intake 4818.42 ml   Output    450 ml   Net 4368.42 ml         Exam:  GEN:  No distress, appears stated age  EYES:   PERRL, anicteric sclera  ENT:    External ears/nose normal, OP clear  NECK:  No adenopathy, midline trachea  LUNGS: Normal chest on inspection, palpation and diminished bilaterally on auscultation  CV:  Normal S1S2, without murmur  ABD:  Non tender, non distended, no hepatosplenomegaly, +BS  EXT:  No edema, cyanosis or clubbing    Scheduled meds:      aspirin 81 mg Oral Daily   atorvastatin 20 mg Oral Nightly   ceftriaxone 1 g Intravenous Q24H   clopidogrel 75 mg Oral Daily   diphenhydrAMINE (BENADRYL) IVPB 50 mg Intravenous Once   docusate sodium 100 mg Oral BID   enoxaparin 40 mg Subcutaneous Nightly   ipratropium-albuterol 3 mL Nebulization 4x Daily - RT   predniSONE 20 mg Oral Daily   Followed by      [START ON 1/8/2017] predniSONE 15 mg Oral Daily   Followed by      [START ON 1/11/2017] predniSONE 10 mg Oral  Daily   Followed by      [START ON 1/14/2017] predniSONE 5 mg Oral Daily     IV meds:                          sodium chloride 125 mL/hr Last Rate: 125 mL/hr (01/05/17 0628)     Data Review:    Results from last 7 days  Lab Units 01/05/17  0656 01/04/17  0552 01/03/17  0029   SODIUM mmol/L 139 138 136   POTASSIUM mmol/L 4.7 4.2 4.0   CHLORIDE mmol/L 108* 106 98   TOTAL CO2 mmol/L 17.0* 18.6* 21.4*   BUN mg/dL 12 15 17   CREATININE mg/dL 1.06 1.01 1.19   GLUCOSE mg/dL 108* 101* 99   CALCIUM mg/dL 8.9 8.4* 9.2         Estimated Creatinine Clearance: 71.3 mL/min (by C-G formula based on Cr of 1.06).    Results from last 7 days  Lab Units 01/05/17  0656 01/04/17  0552 01/03/17  0029   WBC 10*3/mm3 6.11 6.54 10.31   HEMOGLOBIN g/dL 12.1* 12.2* 13.2*   PLATELETS 10*3/mm3 203 184 234       Results from last 7 days  Lab Units 01/03/17  0029   INR  1.12*       Results from last 7 days  Lab Units 01/05/17  0656 01/04/17  0552 01/03/17  0029   ALT (SGPT) U/L 13 11 16   AST (SGOT) U/L 14 12 15       Microbiology reviewed: No growth to date.  RVP negative        Chest x-ray and CT chest reviewed    Assessment   ASSESSMENT:   Bacterial bilateral pneumonia  5 mm noncalcified pulmonary nodule left lower lobe  History of tobacco use  Pleuritic chest pain  COPD  Coronary artery disease  Noted allergy with azithromycin      PLAN:  Continue antibiotics for pneumonia.  Will require repeat CT with contrast for follow-up of lymphadenopathy in approximate 4-6 months.  Likely reactive.  5 mm pulmonary nodule also noted on CT scan.  Patient seems to be improving.  Could likely switch to oral antibiotic and d/c soon from pulmonary standpoint.  Allergy associated with azithromycin noted treated with Benadryl and steroid.    Niall Foster MD  Pulmonary and Critical Care Medicine  [facility], [facility]  1/5/2017    11:26 AM

## 2017-01-06 VITALS
HEIGHT: 69 IN | HEART RATE: 86 BPM | OXYGEN SATURATION: 95 % | RESPIRATION RATE: 18 BRPM | WEIGHT: 164.2 LBS | TEMPERATURE: 98.1 F | BODY MASS INDEX: 24.32 KG/M2 | SYSTOLIC BLOOD PRESSURE: 108 MMHG | DIASTOLIC BLOOD PRESSURE: 79 MMHG

## 2017-01-06 LAB
ANION GAP SERPL CALCULATED.3IONS-SCNC: 14.6 MMOL/L
BUN BLD-MCNC: 20 MG/DL (ref 8–23)
BUN/CREAT SERPL: 19.4 (ref 7–25)
CALCIUM SPEC-SCNC: 8.9 MG/DL (ref 8.8–10.5)
CHLORIDE SERPL-SCNC: 107 MMOL/L (ref 98–107)
CO2 SERPL-SCNC: 15.4 MMOL/L (ref 22–29)
CREAT BLD-MCNC: 1.03 MG/DL (ref 0.76–1.27)
GFR SERPL CREATININE-BSD FRML MDRD: 73 ML/MIN/1.73
GLUCOSE BLD-MCNC: 122 MG/DL (ref 65–99)
POTASSIUM BLD-SCNC: 5.1 MMOL/L (ref 3.5–5.2)
SODIUM BLD-SCNC: 137 MMOL/L (ref 136–145)
WHOLE BLOOD HOLD SPECIMEN: NORMAL
WHOLE BLOOD HOLD SPECIMEN: NORMAL

## 2017-01-06 PROCEDURE — 99238 HOSP IP/OBS DSCHRG MGMT 30/<: CPT | Performed by: HOSPITALIST

## 2017-01-06 PROCEDURE — 80048 BASIC METABOLIC PNL TOTAL CA: CPT | Performed by: HOSPITALIST

## 2017-01-06 PROCEDURE — 94640 AIRWAY INHALATION TREATMENT: CPT

## 2017-01-06 PROCEDURE — 63710000001 PREDNISONE PER 5 MG: Performed by: INTERNAL MEDICINE

## 2017-01-06 PROCEDURE — 94799 UNLISTED PULMONARY SVC/PX: CPT

## 2017-01-06 RX ORDER — PREDNISONE 1 MG/1
15 TABLET ORAL DAILY
Start: 2017-01-08 | End: 2017-01-11

## 2017-01-06 RX ORDER — LEVOFLOXACIN 750 MG/1
750 TABLET ORAL EVERY 24 HOURS
Qty: 6 TABLET | Refills: 0
Start: 2017-01-06 | End: 2017-01-12

## 2017-01-06 RX ORDER — IPRATROPIUM BROMIDE AND ALBUTEROL SULFATE 2.5; .5 MG/3ML; MG/3ML
3 SOLUTION RESPIRATORY (INHALATION)
Qty: 360 ML
Start: 2017-01-06

## 2017-01-06 RX ORDER — PSEUDOEPHEDRINE HCL 30 MG
100 TABLET ORAL 2 TIMES DAILY
Refills: 0
Start: 2017-01-06

## 2017-01-06 RX ORDER — PREDNISONE 20 MG/1
20 TABLET ORAL DAILY
Start: 2017-01-06 | End: 2017-01-07

## 2017-01-06 RX ORDER — PREDNISONE 10 MG/1
10 TABLET ORAL DAILY
Start: 2017-01-11 | End: 2017-01-14

## 2017-01-06 RX ORDER — PREDNISONE 1 MG/1
5 TABLET ORAL DAILY
Qty: 3 TABLET | Refills: 0
Start: 2017-01-14 | End: 2017-01-17

## 2017-01-06 RX ADMIN — CLOPIDOGREL BISULFATE 75 MG: 75 TABLET, FILM COATED ORAL at 09:47

## 2017-01-06 RX ADMIN — PREDNISONE 20 MG: 20 TABLET ORAL at 09:47

## 2017-01-06 RX ADMIN — IPRATROPIUM BROMIDE AND ALBUTEROL SULFATE 3 ML: .5; 3 SOLUTION RESPIRATORY (INHALATION) at 11:44

## 2017-01-06 RX ADMIN — ASPIRIN 81 MG: 81 TABLET, COATED ORAL at 09:47

## 2017-01-06 NOTE — PROGRESS NOTES
[facility] INPATIENT PROGRESS NOTE         [facility] MED SURG    1/6/2017      PATIENT IDENTIFICATION:   Name:  Mario Nicholson      MRN:  [id number]     61 y.o.  male             LOS 3    Reason for visit: Follow-up pneumonia, copd,  pulmonary nodule and pleuritic chest pain    Subjective   SUBJECTIVE:    No new Issues  Objective   OBJECTIVE:  Vitals:    01/05/17 2126 01/06/17 0014 01/06/17 0413 01/06/17 0638   BP:  124/85 101/67 114/82   BP Location:  Left arm Left arm Left arm   Patient Position:  Lying Lying Lying   Pulse: 95 91 85 91   Resp: 20 20 20 20   Temp:  97.4 °F (36.3 °C) 97.9 °F (36.6 °C) 97.4 °F (36.3 °C)   TempSrc:  Oral Oral Oral   SpO2: 96% 96% 95% 96%   Weight:       Height:                             Body mass index is 24.25 kg/(m^2).    Intake/Output Summary (Last 24 hours) at 01/06/17 0938  Last data filed at 01/06/17 0859   Gross per 24 hour   Intake 929.17 ml   Output    600 ml   Net 329.17 ml       No distress    Scheduled meds:      aspirin 81 mg Oral Daily   atorvastatin 20 mg Oral Nightly   clopidogrel 75 mg Oral Daily   diphenhydrAMINE (BENADRYL) IVPB 50 mg Intravenous Once   docusate sodium 100 mg Oral BID   enoxaparin 40 mg Subcutaneous Nightly   ipratropium-albuterol 3 mL Nebulization 4x Daily - RT   levoFLOXacin 750 mg Oral Q24H   predniSONE 20 mg Oral Daily   Followed by      [START ON 1/8/2017] predniSONE 15 mg Oral Daily   Followed by      [START ON 1/11/2017] predniSONE 10 mg Oral Daily   Followed by      [START ON 1/14/2017] predniSONE 5 mg Oral Daily     IV meds:                           Data Review:    Results from last 7 days  Lab Units 01/06/17  0619 01/05/17  0656 01/04/17  0552 01/03/17  0029   SODIUM mmol/L 137 139 138 136   POTASSIUM mmol/L 5.1 4.7 4.2 4.0   CHLORIDE mmol/L 107 108* 106 98   TOTAL CO2 mmol/L 15.4* 17.0* 18.6* 21.4*   BUN mg/dL 20 12 15 17   CREATININE mg/dL 1.03 1.06 1.01 1.19   GLUCOSE mg/dL 122* 108* 101* 99   CALCIUM mg/dL 8.9 8.9 8.4* 9.2          Estimated Creatinine Clearance: 73.4 mL/min (by C-G formula based on Cr of 1.03).    Results from last 7 days  Lab Units 01/05/17  0656 01/04/17  0552 01/03/17  0029   WBC 10*3/mm3 6.11 6.54 10.31   HEMOGLOBIN g/dL 12.1* 12.2* 13.2*   PLATELETS 10*3/mm3 203 184 234       Results from last 7 days  Lab Units 01/03/17  0029   INR  1.12*       Results from last 7 days  Lab Units 01/05/17  0656 01/04/17  0552 01/03/17  0029   ALT (SGPT) U/L 13 11 16   AST (SGOT) U/L 14 12 15       Microbiology reviewed: No growth to date.  RVP negative        Chest x-ray and CT chest reviewed    Assessment   ASSESSMENT:   Bacterial bilateral pneumonia  5 mm noncalcified pulmonary nodule left lower lobe  History of tobacco use  Pleuritic chest pain  COPD  Coronary artery disease  Noted allergy with azithromycin      PLAN:  On oral antibiotic.  COPD symptoms are stable.  Recommend CT follow-up for pulmonary nodule and likely reactive lymphadenopathy approximately 4-6 months.  Okay to DC from my standpoint    Niall Foster MD  Pulmonary and Critical Care Medicine  [facility], [facility]  1/6/2017    9:38 AM

## 2017-01-06 NOTE — PLAN OF CARE
Problem: Pneumonia (Adult)  Intervention: Maximize Oxygenation/Ventilation/Perfusion    01/06/17 0141   Respiratory Interventions   Airway/Ventilation Management pulmonary hygiene promoted;airway patency maintained   Promote Aggressive Pulmonary Hygiene/Secretion Management   Cough And Deep Breathing done independently per patient

## 2017-01-06 NOTE — DISCHARGE SUMMARY
Mario Nicholson  1955  [id number]        Hospitalists Discharge Summary    Date of Admission: 1/3/2017  Date of Discharge:  1/6/2017    Primary Discharge Diagnoses:   1. RLL Pneumonia  2. LLL Pulmonary Nodule  3. AE COPD    Secondary Discharge Diagnoses:   4. Pleuritic chest pain  5. CAD  6. NAGMA  7. Hypertension  8. Dyslipidemia      PCP  Patient Care Team:  No Known Provider as PCP - General    Consults:   Consults     Date and Time Order Name Status Description    1/3/2017 1029 Inpatient Consult to Pulmonology Completed     1/3/2017 0752 Inpatient Consult to Cardiology            Operations and Procedures Performed:       Xr Chest 2 View    Result Date: 1/3/2017  Narrative: PA AND LATERAL CHEST 01/03/2017 AT 0050 HOURS  HISTORY: Left side chest pain, shortness of breath, dizziness started tonight. Previous history of myocardial infarct.  COMPARISON: None.  FINDINGS: Airspace disease is seen within the right lower lobe with small right pleural effusion. No definite left lung infiltrate is identified. Mild cardiomegaly. Left chest wall pacemaker placed with it extending to the region of the right ventricle. No visible pneumothorax.      Impression:  1. Dense peripheral airspace disease within the right lower lobe with small right pleural effusion.  2. Mild cardiomegaly.  This report was finalized on 1/3/2017 8:30 AM by Dr. Elizabeth Beckford MD.      Ct Chest Without Contrast    Result Date: 1/3/2017  Narrative: CT CHEST WITHOUT CONTRAST 01/03/2017 AT 0324 HOURS  HISTORY: 61-year-old male with complaints of left-sided chest pain tonight worse with deep inspiration. Productive cough for 2 days. Emphysema. Previous myocardial infarction.  COMPARISON: PA and lateral chest radiograph 01/03/2017 at 0050 hours.  PROCEDURE: 5 mm noncontrast axial images through the chest. Sagittal and coronal reformatted images were obtained.  FINDINGS: Advanced emphysematous changes are present predominantly in the lung apices.  Dense  peripheral airspace disease with air bronchograms is present in the right lower lobe anteriorly and laterally abutting the major fissure and lateral pleural margin. The area of involvement measures roughly 4.9 x 4.4 cm. More patchy airspace disease is seen peripherally within the right lower lobe posterolaterally. Lesser degree of irregular infiltrate is present in the central right upper lobe. Noncalcified nodule within the left lower lobe (series 3, image 41) measures 5 mm. Minimal posterior left basilar atelectasis. Trace right pleural effusion. Mild cardiomegaly. Stent in the left anterior descending coronary artery. No pericardial effusion.  There are enlarged mediastinal lymph nodes. There is a dominant precarinal lymph node containing coarse eccentric calcification measuring up to 1.8 cm short axis. Another coarsely calcified subcarinal node measures up to 2 cm short axis. Other noncalcified nodes are present in the upper mediastinum including a right paratracheal node measuring 1.3 cm. Findings are nonspecific and could represent changes of calcified and noncalcified granulomatous disease. Follow-up recommended or correlation with outside imaging studies recommended to document stability, however.  Left chest wall pacemaker in place.  No pneumothorax.  Included portions of the upper abdominal organs are within normal limits. Small amount of excreted contrast material is seen within the renal collecting systems. Tiny gallstones incidentally noted. Degenerative endplate changes are present within the thoracic spine. No acute osseous abnormalities are identified.      Impression: 1. Dense peripheral airspace disease in the right lower lobe. More patchy alveolar disease changes are present within the right lower lobe, right upper lobe, and to a lesser degree left lower lobe. Findings are favored to represent changes of pneumonia, given the appropriate clinical context. Other etiologies such as pulmonary infarct  not excluded but thought less likely.  2. Enlarged mediastinal lymph nodes as described above. Both benign and malignant etiologies in the differential. Correlation with outside imaging studies recommended if available. Alternatively, short-term CT chest follow-up within 6 months is recommended.  3. 5 mm noncalcified left lower lobe pulmonary nodule. This, too, can be monitored at follow-up or at time of comparison with outside studies.  4. Moderately advanced emphysema.  5. Cardiomegaly with coronary artery stent placement.  6. Uncomplicated cholelithiasis.  7. Trace right pleural effusion.  8. Preliminary report was provided by Dr. Becerra on 01/03/2017 at 0402 hours.  This report was finalized on 1/3/2017 8:30 AM by Dr. Elizabeth Beckford MD.        Allergies:  is allergic to azithromycin and contrast dye.    Uday  reviewed    Discharge Medications:   Mario Nicholson   Home Medication Instructions JOSÉ MANUEL:[id number]    Printed on:01/06/17 1233   Medication Information                      acetaminophen (TYLENOL) 325 MG tablet  Take 650 mg by mouth Every 6 (Six) Hours As Needed for mild pain (1-3).             albuterol (PROVENTIL HFA;VENTOLIN HFA) 108 (90 BASE) MCG/ACT inhaler  Inhale 2 puffs Every 4 (Four) Hours As Needed for wheezing.             aspirin 81 MG EC tablet  Take 81 mg by mouth Daily.             clopidogrel (PLAVIX) 75 MG tablet  Take 75 mg by mouth Daily.             docusate sodium 100 MG capsule  Take 100 mg by mouth 2 (Two) Times a Day.             furosemide (LASIX) 40 MG tablet  Take 40 mg by mouth Daily.             ipratropium-albuterol (DUO-NEB) 0.5-2.5 mg/mL nebulizer  Take 3 mL by nebulization 4 (Four) Times a Day.             levoFLOXacin (LEVAQUIN) 750 MG tablet  Take 1 tablet by mouth Daily for 6 days. Indications: Pneumonia             nitroglycerin (NITROSTAT) 0.4 MG SL tablet  Place 0.4 mg under the tongue Every 5 (Five) Minutes As Needed for chest pain. Take no more than 3 doses in 15  minutes.             predniSONE (DELTASONE) 10 MG tablet  Take 1 tablet by mouth Daily for 3 doses.             predniSONE (DELTASONE) 20 MG tablet  Take 1 tablet by mouth Daily for 1 dose.             predniSONE (DELTASONE) 5 MG tablet  Take 3 tablets by mouth Daily for 3 doses.             predniSONE (DELTASONE) 5 MG tablet  Take 1 tablet by mouth Daily for 3 doses.             simvastatin (ZOCOR) 40 MG tablet  Take 40 mg by mouth Every Night.                 History of Present Illness: (from H&P)  Mr. Mario Nicholson is a 61 year old  male who is known to have HTN, Hyperlipidemia, CAD/MI, S/P CABG and Status quo cardiac defibrillator, who presented to ED via ambulance for evaluation of new onset chest pains. He has a hx of CAD with an MI in 2016 that required stenting and subsequent defibrillator implantation. These procedures were done in [place], [place]. Unfortunately, pt was placed under arrest about one month ago and was recently transferred to a local snf in our area ([facility]). He has been taking his usual daily heart-related medications throughout his imprisonment, however he says that prior to his transfer to this new snf, he was started briefly on someone unknown antibiotic for a pneumonia but never got to finish that prescription here. Last night, as he was lying in his bunk, he suddenly developed some new left sided chest pains that began to worry him. He says the pain is worse with certain positions and is also worse with deep breaths. He has had some moderate productive cough with yellow sputum for several weeks. He also says that he does not smoke now but smoked for several years before recently quitting. He denies any recent fevers. He denies any hx of abdominal pain, nausea, vomiting, dizziness, or sweating. He denies any sx of dysuria or recent hx of blood in stool    Hospital Course  1. RLL Pneumonia: Initially on azithromycin and rocephin IV, patient with allergic rxn to azithromycin  which has now resolved.  Switched yesterday to po levoquin to complete course.  Patient afebrile and WBC WNL. Note, EKG with NL QTc.    2. LLL Pulmonary Nodule: Pulmonary following here and need repeat CT chest with contrast for F/U in 4-6 months. F/U out-patient.    3. AE COPD: Was initially give IV steroids then switched to oral prednisone. COPD symptoms now stable. Continue steroid taper and nebs at discharge.    4. Pleuritic chest pain: Secondary to #1 above. Ruled out for AMI.    5. CAD: No acute issues here, troponins negative here.  Home regimen continued, patient recently stented in 2016. Continue ASA, plavix and statin.    6. NAGMA: Unclear etiology, present at admission and LA negative. Recommend repeat labs at [facility] for monitoring and W/U outpatient if this persists.    7. Hypertension: BP WNL, resume home lasix at discharge.     8. Dyslipidemia: continue statin.       Last Lab Results:   Lab Results (most recent)     Procedure Component Value Units Date/Time    CBC & Differential [[id number]] Collected:  01/03/17 0029    Specimen:  Blood Updated:  01/03/17 0042    Narrative:       The following orders were created for panel order CBC & Differential.  Procedure                               Abnormality         Status                     ---------                               -----------         ------                     CBC Auto Differential[[id number]]         Abnormal            Final result                 Please view results for these tests on the individual orders.    CBC Auto Differential [[id number]]  (Abnormal) Collected:  01/03/17 0029    Specimen:  Blood Updated:  01/03/17 0042     WBC 10.31 10*3/mm3      RBC 4.55 (L) 10*6/mm3      Hemoglobin 13.2 (L) g/dL      Hematocrit 40.0 (L) %      MCV 87.9 fL      MCH 29.0 pg      MCHC 33.0 g/dL      RDW 15.4 (H) %      RDW-SD 49.4 fl      MPV 11.5 (H) fL      Platelets 234 10*3/mm3      Neutrophil % 78.6 (H) %      Lymphocyte % 11.3 (L) %      Monocyte % 6.4 %       Eosinophil % 2.3 %      Basophil % 1.1 %      Immature Grans % 0.3 %      Neutrophils, Absolute 8.11 10*3/mm3      Lymphocytes, Absolute 1.16 10*3/mm3      Monocytes, Absolute 0.66 10*3/mm3      Eosinophils, Absolute 0.24 10*3/mm3      Basophils, Absolute 0.11 10*3/mm3      Immature Grans, Absolute 0.03 10*3/mm3      nRBC 0.0 /100 WBC     Protime-INR [[id number]]  (Abnormal) Collected:  01/03/17 0029    Specimen:  Blood Updated:  01/03/17 0058     Protime 14.1 Seconds      INR 1.12 (H)     Narrative:       Therapeutic Ranges for INR: 2.0-3.0 (PT 20-30)                              2.5-3.5 (PT 25-34)    Troponin [[id number]]  (Normal) Collected:  01/03/17 0029    Specimen:  Blood Updated:  01/03/17 0106     Troponin T 0.012 ng/mL     Narrative:       Troponin T Reference Ranges:  Less than 0.03 ng/mL:    Negative for AMI  0.03 to 0.09 ng/mL:      Indeterminant for AMI  Greater than 0.09 ng/mL: Positive for AMI    Comprehensive Metabolic Panel [[id number]]  (Abnormal) Collected:  01/03/17 0029    Specimen:  Blood Updated:  01/03/17 0112     Glucose 99 mg/dL      BUN 17 mg/dL      Creatinine 1.19 mg/dL      Sodium 136 mmol/L      Potassium 4.0 mmol/L      Chloride 98 mmol/L      CO2 21.4 (L) mmol/L      Calcium 9.2 mg/dL      Total Protein 6.9 g/dL      Albumin 3.60 g/dL      ALT (SGPT) 16 U/L      AST (SGOT) 15 U/L      Alkaline Phosphatase 90 U/L      Total Bilirubin 0.7 mg/dL      eGFR Non African Amer 62 mL/min/1.73      Globulin 3.3 gm/dL      A/G Ratio 1.1 g/dL      BUN/Creatinine Ratio 14.3      Anion Gap 16.6 mmol/L     D-dimer, Quantitative [[id number]]  (Abnormal) Collected:  01/03/17 0030    Specimen:  Blood Updated:  01/03/17 0549     D-Dimer, Quantitative 0.47 (H) MCGFEU/mL     Narrative:       Can be elevated in, but is not diagnostic for deep vein thrombosis (DVT) or pulmonary embolis (PE).  It is also elevated in other medical conditions.  Clinical correlation is required.  The negative cut-off value for  the D-Dimer is 0.50 mcg FEU/mL for DVT and PE.    Troponin [[id number]]  (Normal) Collected:  01/03/17 0533    Specimen:  Blood Updated:  01/03/17 0610     Troponin T <0.010 ng/mL     Narrative:       Troponin T Reference Ranges:  Less than 0.03 ng/mL:    Negative for AMI  0.03 to 0.09 ng/mL:      Indeterminant for AMI  Greater than 0.09 ng/mL: Positive for AMI    Lactic Acid, Plasma [[id number]]  (Normal) Collected:  01/03/17 0820    Specimen:  Blood Updated:  01/03/17 0850     Lactate 1.0 mmol/L     Troponin [[id number]]  (Normal) Collected:  01/03/17 1159    Specimen:  Blood Updated:  01/03/17 1222     Troponin T <0.010 ng/mL     Narrative:       Troponin T Reference Ranges:  Less than 0.03 ng/mL:    Negative for AMI  0.03 to 0.09 ng/mL:      Indeterminant for AMI  Greater than 0.09 ng/mL: Positive for AMI    Respiratory Panel, PCR [[id number]]  (Normal) Collected:  01/03/17 1351    Specimen:  Swab from Nares Updated:  01/03/17 2258     ADENOVIRUS, PCR Not Detected      Coronavirus 229E Not Detected      Coronavirus HKU1 Not Detected      Coronavirus NL63 Not Detected      Coronavirus OC43 Not Detected      Human Metapneumovirus Not Detected      Human Rhinovirus/Enterovirus Not Detected      Influenza B PCR Not Detected      Parainfluenza Virus 1 Not Detected      Parainfluenza Virus 2 Not Detected      Parainfluenza Virus 3 Not Detected      Parainfluenza Virus 4 Not Detected      Bordetella pertussis pcr Not Detected      Influenza 2009 H1N1 by PCR Not Detected      Chlamydophila pneumoniae PCR Not Detected      Mycoplasma pneumo by PCR Not Detected      Influenza A PCR Not Detected      Influenza A H3 Not Detected      Influenza A H1 Not Detected      RSV, PCR Not Detected     CBC & Differential [[id number]] Collected:  01/04/17 0552    Specimen:  Blood Updated:  01/04/17 0612    Narrative:       The following orders were created for panel order CBC & Differential.  Procedure                               Abnormality          Status                     ---------                               -----------         ------                     CBC Auto Differential[[id number]]         Abnormal            Final result                 Please view results for these tests on the individual orders.    CBC Auto Differential [[id number]]  (Abnormal) Collected:  01/04/17 0552    Specimen:  Blood Updated:  01/04/17 0612     WBC 6.54 10*3/mm3      RBC 4.18 (L) 10*6/mm3      Hemoglobin 12.2 (L) g/dL      Hematocrit 37.7 (L) %      MCV 90.2 fL      MCH 29.2 pg      MCHC 32.4 g/dL      RDW 15.8 (H) %      RDW-SD 52.0 fl      MPV 11.4 (H) fL      Platelets 184 10*3/mm3      Neutrophil % 73.4 (H) %      Lymphocyte % 15.9 (L) %      Monocyte % 5.8 %      Eosinophil % 3.1 %      Basophil % 1.5 %      Immature Grans % 0.3 %      Neutrophils, Absolute 4.80 10*3/mm3      Lymphocytes, Absolute 1.04 10*3/mm3      Monocytes, Absolute 0.38 10*3/mm3      Eosinophils, Absolute 0.20 10*3/mm3      Basophils, Absolute 0.10 10*3/mm3      Immature Grans, Absolute 0.02 10*3/mm3      nRBC 0.0 /100 WBC     Comprehensive Metabolic Panel [[id number]]  (Abnormal) Collected:  01/04/17 0552    Specimen:  Blood Updated:  01/04/17 0642     Glucose 101 (H) mg/dL      BUN 15 mg/dL      Creatinine 1.01 mg/dL      Sodium 138 mmol/L      Potassium 4.2 mmol/L      Chloride 106 mmol/L      CO2 18.6 (L) mmol/L      Calcium 8.4 (L) mg/dL      Total Protein 6.0 g/dL      Albumin 3.40 (L) g/dL      ALT (SGPT) 11 U/L      AST (SGOT) 12 U/L      Alkaline Phosphatase 77 U/L      Total Bilirubin 0.6 mg/dL      eGFR Non African Amer 75 mL/min/1.73      Globulin 2.6 gm/dL      A/G Ratio 1.3 g/dL      BUN/Creatinine Ratio 14.9      Anion Gap 13.4 mmol/L     Lactic Acid, Plasma [[id number]]  (Normal) Collected:  01/04/17 0552    Specimen:  Blood Updated:  01/04/17 0642     Lactate 1.4 mmol/L     CBC & Differential [[id number]] Collected:  01/05/17 0656    Specimen:  Blood Updated:  01/05/17 0723     Narrative:       The following orders were created for panel order CBC & Differential.  Procedure                               Abnormality         Status                     ---------                               -----------         ------                     CBC Auto Differential[[id number]]         Abnormal            Final result                 Please view results for these tests on the individual orders.    CBC Auto Differential [[id number]]  (Abnormal) Collected:  01/05/17 0656    Specimen:  Blood Updated:  01/05/17 0723     WBC 6.11 10*3/mm3      RBC 4.21 (L) 10*6/mm3      Hemoglobin 12.1 (L) g/dL      Hematocrit 38.1 (L) %      MCV 90.5 fL      MCH 28.7 pg      MCHC 31.8 g/dL      RDW 16.0 (H) %      RDW-SD 52.7 fl      MPV 11.8 (H) fL      Platelets 203 10*3/mm3      Neutrophil % 83.8 (H) %      Lymphocyte % 7.7 (L) %      Monocyte % 3.6 %      Eosinophil % 3.1 %      Basophil % 1.3 %      Immature Grans % 0.5 %      Neutrophils, Absolute 5.12 10*3/mm3      Lymphocytes, Absolute 0.47 (L) 10*3/mm3      Monocytes, Absolute 0.22 10*3/mm3      Eosinophils, Absolute 0.19 10*3/mm3      Basophils, Absolute 0.08 10*3/mm3      Immature Grans, Absolute 0.03 10*3/mm3      nRBC 0.0 /100 WBC     Lactic Acid, Plasma [[id number]]  (Normal) Collected:  01/05/17 0656    Specimen:  Blood Updated:  01/05/17 0730     Lactate 1.4 mmol/L     Comprehensive Metabolic Panel [[id number]]  (Abnormal) Collected:  01/05/17 0656    Specimen:  Blood Updated:  01/05/17 0736     Glucose 108 (H) mg/dL      BUN 12 mg/dL      Creatinine 1.06 mg/dL      Sodium 139 mmol/L      Potassium 4.7 mmol/L      Chloride 108 (H) mmol/L      CO2 17.0 (L) mmol/L      Calcium 8.9 mg/dL      Total Protein 6.2 g/dL      Albumin 3.20 (L) g/dL      ALT (SGPT) 13 U/L      AST (SGOT) 14 U/L      Alkaline Phosphatase 83 U/L      Total Bilirubin 0.8 mg/dL      eGFR Non African Amer 71 mL/min/1.73      Globulin 3.0 gm/dL      A/G Ratio 1.1 g/dL      BUN/Creatinine Ratio  11.3      Anion Gap 14.0 mmol/L     Respiratory Culture [[id number]] Collected:  01/03/17 1623    Specimen:  Sputum from Cough Updated:  01/05/17 0913     Respiratory Culture Scant growth (1+) Normal Respiratory Camille      Gram Stain Result        Moderate (3+) Mixed bacterial morphotypes seen on Gram Stain    Blood Culture [[id number]]  (Normal) Collected:  01/03/17 0533    Specimen:  Blood from Arm, Right Updated:  01/06/17 0601     Blood Culture No growth at 3 days     Blood Culture [[id number]]  (Normal) Collected:  01/03/17 0533    Specimen:  Blood from Arm, Left Updated:  01/06/17 0601     Blood Culture No growth at 3 days     Extra Tubes [[id number]] Collected:  01/06/17 0619    Specimen:  Blood from Blood, Venous Line Updated:  01/06/17 0619    Narrative:       The following orders were created for panel order Extra Tubes.  Procedure                               Abnormality         Status                     ---------                               -----------         ------                     Light Blue Top[[id number]]                                    In process                 Lavender Top[[id number]]                                      In process                   Please view results for these tests on the individual orders.    Light Blue Top [[id number]] Collected:  01/06/17 0619    Specimen:  Blood Updated:  01/06/17 0619    Lavender Top [[id number]] Collected:  01/06/17 0619    Specimen:  Blood Updated:  01/06/17 0619    Basic Metabolic Panel [[id number]]  (Abnormal) Collected:  01/06/17 0619    Specimen:  Blood Updated:  01/06/17 0642     Glucose 122 (H) mg/dL      BUN 20 mg/dL      Creatinine 1.03 mg/dL      Sodium 137 mmol/L      Potassium 5.1 mmol/L      Chloride 107 mmol/L      CO2 15.4 (L) mmol/L      Calcium 8.9 mg/dL      eGFR Non African Amer 73 mL/min/1.73      BUN/Creatinine Ratio 19.4      Anion Gap 14.6 mmol/L     Narrative:       GFR Normal >60  Chronic Kidney Disease <60  Kidney Failure <15         Imaging Results (most recent)     Procedure Component Value Units Date/Time    CT Chest Without Contrast [[id number]] Collected:  01/03/17 0811     Updated:  01/03/17 0832    Narrative:       CT CHEST WITHOUT CONTRAST 01/03/2017 AT 0324 HOURS     HISTORY: 61-year-old male with complaints of left-sided chest pain  tonight worse with deep inspiration. Productive cough for 2 days.  Emphysema. Previous myocardial infarction.     COMPARISON: PA and lateral chest radiograph 01/03/2017 at 0050 hours.     PROCEDURE: 5 mm noncontrast axial images through the chest. Sagittal and  coronal reformatted images were obtained.     FINDINGS: Advanced emphysematous changes are present predominantly in  the lung apices.     Dense peripheral airspace disease with air bronchograms is present in  the right lower lobe anteriorly and laterally abutting the major fissure  and lateral pleural margin. The area of involvement measures roughly 4.9  x 4.4 cm. More patchy airspace disease is seen peripherally within the  right lower lobe posterolaterally. Lesser degree of irregular infiltrate  is present in the central right upper lobe. Noncalcified nodule within  the left lower lobe (series 3, image 41) measures 5 mm. Minimal  posterior left basilar atelectasis. Trace right pleural effusion. Mild  cardiomegaly. Stent in the left anterior descending coronary artery. No  pericardial effusion.     There are enlarged mediastinal lymph nodes. There is a dominant  precarinal lymph node containing coarse eccentric calcification  measuring up to 1.8 cm short axis. Another coarsely calcified subcarinal  node measures up to 2 cm short axis. Other noncalcified nodes are  present in the upper mediastinum including a right paratracheal node  measuring 1.3 cm. Findings are nonspecific and could represent changes  of calcified and noncalcified granulomatous disease. Follow-up  recommended or correlation with outside imaging studies recommended to  document  stability, however.     Left chest wall pacemaker in place.     No pneumothorax.     Included portions of the upper abdominal organs are within normal  limits. Small amount of excreted contrast material is seen within the  renal collecting systems. Tiny gallstones incidentally noted.  Degenerative endplate changes are present within the thoracic spine. No  acute osseous abnormalities are identified.       Impression:       1. Dense peripheral airspace disease in the right lower lobe. More  patchy alveolar disease changes are present within the right lower lobe,  right upper lobe, and to a lesser degree left lower lobe. Findings are  favored to represent changes of pneumonia, given the appropriate  clinical context. Other etiologies such as pulmonary infarct not  excluded but thought less likely.     2. Enlarged mediastinal lymph nodes as described above. Both benign and  malignant etiologies in the differential. Correlation with outside  imaging studies recommended if available. Alternatively, short-term CT  chest follow-up within 6 months is recommended.     3. 5 mm noncalcified left lower lobe pulmonary nodule. This, too, can be  monitored at follow-up or at time of comparison with outside studies.     4. Moderately advanced emphysema.      5. Cardiomegaly with coronary artery stent placement.      6. Uncomplicated cholelithiasis.     7. Trace right pleural effusion.     8. Preliminary report was provided by Dr. Becerra on 01/03/2017 at 0402  hours.      This report was finalized on 1/3/2017 8:30 AM by Dr. Elizabeth Beckford MD.       XR Chest 2 View [[id number]] Collected:  01/03/17 0814     Updated:  01/03/17 0832    Narrative:       PA AND LATERAL CHEST 01/03/2017 AT 0050 HOURS     HISTORY: Left side chest pain, shortness of breath, dizziness started  tonight. Previous history of myocardial infarct.     COMPARISON: None.     FINDINGS: Airspace disease is seen within the right lower lobe with  small right pleural  effusion. No definite left lung infiltrate is  identified. Mild cardiomegaly. Left chest wall pacemaker placed with it  extending to the region of the right ventricle. No visible pneumothorax.       Impression:          1. Dense peripheral airspace disease within the right lower lobe with  small right pleural effusion.     2. Mild cardiomegaly.      This report was finalized on 1/3/2017 8:30 AM by Dr. Elizabeth Beckford MD.             PROCEDURES      Condition on Discharge:  Stable    Physical Exam at Discharge  Vital Signs  Temp:  [96.6 °F (35.9 °C)-97.9 °F (36.6 °C)] 97.4 °F (36.3 °C)  Heart Rate:  [74-95] 74  Resp:  [18-20] 20  BP: (101-124)/(67-85) 114/82   O2 Saturation: 95-96% on RA    Physical Exam:  Physical Exam   Constitutional: Patient appears well-developed and well-nourished and in no acute distress   HEENT:   Head: Normocephalic and atraumatic.   Eyes:  Sclera are anicteric and non-injected.  Mouth and Throat: Patient has moist mucous membranes.      Neck: Neck supple. No JVD present.   Cardiovascular: Regular rate, regular rhythm, Exam reveals no gallop and no friction rub.  No murmur heard.  Pulmonary/Chest: Lungs with diminished breath sounds throughout. No respiratory distress. No wheezes. No rhonchi. No rales.   Abdominal: Soft. Bowel sounds are normal. No distension and no mass. There is no tenderness.   Extremities: No edema. Pulses are palpable in all 4 extremities.  Neurological: Patient is alert and oriented to person, place, and time.   Skin: Skin is warm. No rash noted.    Discharge Disposition  [facility]    Visiting Nurse:    No     Home PT/OT:  No     Home Safety Evaluation:  No     DME  None    Discharge Diet:           Dietary Orders            Start     Ordered    01/04/17 2108  Diet Regular; Cardiac; Safe Tray  Diet Effective Now     Comments:  inmate   Question Answer Comment   Diet Texture / Consistency Regular    Common Modifiers Cardiac    Other Modifiers: Safe Tray        01/04/17 2108           Activity at Discharge:  As tolerated    Pre-discharge education  None      Follow-up Appointments  No future appointments.  Referrals and Follow-ups to Schedule     Additional Follow-Up    As directed    4 weeks, for F/U of lung nodule   Follow Up Details:  pulmonary       Follow-Up    As directed    MD at [facility] (Dr. Perez or associate)   Follow Up Details:  upon arrival back to [facility]               Recommend F/U BMP at [facility] in 1 week to F/U NAGMA and further W/U as felt indicated my MD there.    Test Results Pending at Discharge   Order Current Status    Blood Culture Preliminary result    Blood Culture Preliminary result           Elvia Gallego MD  01/06/17  12:33 PM    Time: Discharge <30 min

## 2017-01-06 NOTE — PROGRESS NOTES
"Hospitalist Team      Patient Care Team:  No Known Provider as PCP - General        Chief Complaint:  Follow-up RLL Pneumonia    Subjective    No further reactions.  Appetite better but not great, but does tolerate PO.  Breathing better.  No further chest pain.    Objective    Vital Signs  Temp:  [96.6 °F (35.9 °C)-97.8 °F (36.6 °C)] 96.6 °F (35.9 °C)  Heart Rate:  [53-97] 80  Resp:  [18-26] 18  BP: (100-127)/(68-86) 104/72   SaO2: 96% on RA    Flowsheet Rows         First Filed Value    Admission Height  71\" (180.3 cm) Documented at 01/03/2017 0059    Admission Weight  177 lb (80.3 kg) Documented at 01/03/2017 0059          Physical Exam:    General Appearance:    Resting and in no acute distress   Lungs:     Diminished to auscultation,respirations regular, even and         Unlabored; No wheeze, rhonchi, or rales    Heart:    Regular rhythm and normal rate, normal S1 and S2, no            murmur, no gallop, no rub, no click   Abdomen:     Soft and non-tender w/ active bowel sounds   Extremities:   Moves all extremities well, no edema, no cyanosis   Pulses:   Radial pulses palpable and equal bilaterally   Neurologic:   Cranial nerves 2 - 12 grossly intact       Results Review:     I reviewed the patient's new clinical results.    Lab Results (last 24 hours)     Procedure Component Value Units Date/Time    Blood Culture [[id number]]  (Normal) Collected:  01/03/17 0533    Specimen:  Blood from Arm, Right Updated:  01/05/17 0601     Blood Culture No growth at 2 days     Blood Culture [[id number]]  (Normal) Collected:  01/03/17 0533    Specimen:  Blood from Arm, Left Updated:  01/05/17 0601     Blood Culture No growth at 2 days     CBC & Differential [[id number]] Collected:  01/05/17 0656    Specimen:  Blood Updated:  01/05/17 0723    Narrative:       The following orders were created for panel order CBC & Differential.  Procedure                               Abnormality         Status                     ---------           "                     -----------         ------                     CBC Auto Differential[[id number]]         Abnormal            Final result                 Please view results for these tests on the individual orders.    CBC Auto Differential [[id number]]  (Abnormal) Collected:  01/05/17 0656    Specimen:  Blood Updated:  01/05/17 0723     WBC 6.11 10*3/mm3      RBC 4.21 (L) 10*6/mm3      Hemoglobin 12.1 (L) g/dL      Hematocrit 38.1 (L) %      MCV 90.5 fL      MCH 28.7 pg      MCHC 31.8 g/dL      RDW 16.0 (H) %      RDW-SD 52.7 fl      MPV 11.8 (H) fL      Platelets 203 10*3/mm3      Neutrophil % 83.8 (H) %      Lymphocyte % 7.7 (L) %      Monocyte % 3.6 %      Eosinophil % 3.1 %      Basophil % 1.3 %      Immature Grans % 0.5 %      Neutrophils, Absolute 5.12 10*3/mm3      Lymphocytes, Absolute 0.47 (L) 10*3/mm3      Monocytes, Absolute 0.22 10*3/mm3      Eosinophils, Absolute 0.19 10*3/mm3      Basophils, Absolute 0.08 10*3/mm3      Immature Grans, Absolute 0.03 10*3/mm3      nRBC 0.0 /100 WBC     Lactic Acid, Plasma [[id number]]  (Normal) Collected:  01/05/17 0656    Specimen:  Blood Updated:  01/05/17 0730     Lactate 1.4 mmol/L     Comprehensive Metabolic Panel [[id number]]  (Abnormal) Collected:  01/05/17 0656    Specimen:  Blood Updated:  01/05/17 0736     Glucose 108 (H) mg/dL      BUN 12 mg/dL      Creatinine 1.06 mg/dL      Sodium 139 mmol/L      Potassium 4.7 mmol/L      Chloride 108 (H) mmol/L      CO2 17.0 (L) mmol/L      Calcium 8.9 mg/dL      Total Protein 6.2 g/dL      Albumin 3.20 (L) g/dL      ALT (SGPT) 13 U/L      AST (SGOT) 14 U/L      Alkaline Phosphatase 83 U/L      Total Bilirubin 0.8 mg/dL      eGFR Non African Amer 71 mL/min/1.73      Globulin 3.0 gm/dL      A/G Ratio 1.1 g/dL      BUN/Creatinine Ratio 11.3      Anion Gap 14.0 mmol/L     Respiratory Culture [[id number]] Collected:  01/03/17 1623    Specimen:  Sputum from Cough Updated:  01/05/17 0913     Respiratory Culture Scant growth (1+)  Normal Respiratory Camille      Gram Stain Result        Moderate (3+) Mixed bacterial morphotypes seen on Gram Stain          Medication Review:   I have reviewed the patient's current medication list    Current Facility-Administered Medications:   •  acetaminophen (TYLENOL) tablet 650 mg, 650 mg, Oral, Q6H PRN, Jamin Nixon MD, 650 mg at 01/04/17 1002  •  aspirin EC tablet 81 mg, 81 mg, Oral, Daily, Jamin Nixon MD, 81 mg at 01/05/17 0951  •  atorvastatin (LIPITOR) tablet 20 mg, 20 mg, Oral, Nightly, Jamin Nixon MD, 20 mg at 01/04/17 2204  •  cefTRIAXone (ROCEPHIN) IVPB 1 g, 1 g, Intravenous, Q24H, Jamin Nixon MD, Stopped at 01/05/17 0428  •  clopidogrel (PLAVIX) tablet 75 mg, 75 mg, Oral, Daily, Jamin Nixon MD, 75 mg at 01/05/17 0951  •  diphenhydrAMINE (BENADRYL) 50 mg in sodium chloride 0.9 % 50 mL IVPB, 50 mg, Intravenous, Once, Jamin Nixon MD  •  diphenhydrAMINE (BENADRYL) capsule 50 mg, 50 mg, Oral, Q6H PRN, Jamin Nixon MD  •  docusate sodium (COLACE) capsule 100 mg, 100 mg, Oral, BID, Jamin Nixon MD, 100 mg at 01/05/17 0951  •  enoxaparin (LOVENOX) syringe 40 mg, 40 mg, Subcutaneous, Nightly, Jamin Nixon MD, 40 mg at 01/04/17 2204  •  ipratropium-albuterol (DUO-NEB) nebulizer solution 3 mL, 3 mL, Nebulization, 4x Daily - RT, Jamin Nixon MD, 3 mL at 01/05/17 1545  •  nitroglycerin (NITROSTAT) SL tablet 0.4 mg, 0.4 mg, Sublingual, Q5 Min PRN, Jamin Nixon MD  •  predniSONE (DELTASONE) tablet 20 mg, 20 mg, Oral, Daily, 20 mg at 01/05/17 1002 **FOLLOWED BY** [START ON 1/8/2017] predniSONE (DELTASONE) tablet 15 mg, 15 mg, Oral, Daily **FOLLOWED BY** [START ON 1/11/2017] predniSONE (DELTASONE) tablet 10 mg, 10 mg, Oral, Daily **FOLLOWED BY** [START ON 1/14/2017] predniSONE (DELTASONE) tablet 5 mg, 5 mg, Oral, Daily, Jamin Nixon MD  •  Insert peripheral IV, , , Once **AND** sodium chloride 0.9 % flush 10 mL, 10 mL, Intravenous, PRN, Rajat Orozco MD  •  sodium chloride  0.9 % infusion, 125 mL/hr, Intravenous, Continuous, Jamin Nixon MD, Last Rate: 125 mL/hr at 01/05/17 1356, 125 mL/hr at 01/05/17 1356      Assessment/Plan     1.  RLL Pneumonia:  Meets switch criteria so will change to po Levaquin.  QTc OK.  Continue steroid taper.    2.  LLL Nodule:  Need repeat contrasted CT in 4-6 weeks.    3.  CAD:  AMI r/o.  Continue current regimen.  However, I do not see where the patient is on a beta blocker.    4.  Non-anion gap metabolic acidosis:  Lactate was normal.  Will repeat in AM.    5.  COPD:  Nothing acute.  No change.      Plan for disposition: Back to [facility].    Shmuel Kim MD  01/05/17  7:01 PM

## 2017-01-08 LAB
BACTERIA SPEC AEROBE CULT: NORMAL
BACTERIA SPEC AEROBE CULT: NORMAL
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
## 2017-04-22 NOTE — ED NOTES
Wound to patients left hand and left forearm cleaned with hibbiclens. Steri strips applied to each wound       Brent Veronica  04/22/17 0156

## 2017-04-22 NOTE — ED NOTES
The patient verbalized understanding of discharge instructions, medications and follow up.  The patient was taken from the ER via wheelchair.  No further needs at this time.      Saima Coates RN  04/22/17 0212
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
## 2017-04-22 NOTE — DISCHARGE INSTRUCTIONS
Medication as directed.  Wear splint until seen by orthopedist.  Apply ice to areas of pain and swelling. Follow-up with Dr. Reddy as above.  Return to ED for signs of infection, mental status changes or medical emergencies.    Hypertension  Hypertension, commonly called high blood pressure, is when the force of blood pumping through your arteries is too strong. Your arteries are the blood vessels that carry blood from your heart throughout your body. A blood pressure reading consists of a higher number over a lower number, such as 110/72. The higher number (systolic) is the pressure inside your arteries when your heart pumps. The lower number (diastolic) is the pressure inside your arteries when your heart relaxes. Ideally you want your blood pressure below 120/80.  Hypertension forces your heart to work harder to pump blood. Your arteries may become narrow or stiff. Having untreated or uncontrolled hypertension can cause heart attack, stroke, kidney disease, and other problems.  RISK FACTORS  Some risk factors for high blood pressure are controllable. Others are not.   Risk factors you cannot control include:   · Race. You may be at higher risk if you are .  · Age. Risk increases with age.  · Gender. Men are at higher risk than women before age 45 years. After age 65, women are at higher risk than men.  Risk factors you can control include:  · Not getting enough exercise or physical activity.  · Being overweight.  · Getting too much fat, sugar, calories, or salt in your diet.  · Drinking too much alcohol.  SIGNS AND SYMPTOMS  Hypertension does not usually cause signs or symptoms. Extremely high blood pressure (hypertensive crisis) may cause headache, anxiety, shortness of breath, and nosebleed.  DIAGNOSIS  To check if you have hypertension, your health care provider will measure your blood pressure while you are seated, with your arm held at the level of your heart. It should be measured at least  twice using the same arm. Certain conditions can cause a difference in blood pressure between your right and left arms. A blood pressure reading that is higher than normal on one occasion does not mean that you need treatment. If it is not clear whether you have high blood pressure, you may be asked to return on a different day to have your blood pressure checked again. Or, you may be asked to monitor your blood pressure at home for 1 or more weeks.  TREATMENT  Treating high blood pressure includes making lifestyle changes and possibly taking medicine. Living a healthy lifestyle can help lower high blood pressure. You may need to change some of your habits.  Lifestyle changes may include:  · Following the DASH diet. This diet is high in fruits, vegetables, and whole grains. It is low in salt, red meat, and added sugars.  · Keep your sodium intake below 2,300 mg per day.  · Getting at least 30-45 minutes of aerobic exercise at least 4 times per week.  · Losing weight if necessary.  · Not smoking.  · Limiting alcoholic beverages.  · Learning ways to reduce stress.  Your health care provider may prescribe medicine if lifestyle changes are not enough to get your blood pressure under control, and if one of the following is true:  · You are 18-59 years of age and your systolic blood pressure is above 140.  · You are 60 years of age or older, and your systolic blood pressure is above 150.  · Your diastolic blood pressure is above 90.  · You have diabetes, and your systolic blood pressure is over 140 or your diastolic blood pressure is over 90.  · You have kidney disease and your blood pressure is above 140/90.  · You have heart disease and your blood pressure is above 140/90.  Your personal target blood pressure may vary depending on your medical conditions, your age, and other factors.  HOME CARE INSTRUCTIONS  · Have your blood pressure rechecked as directed by your health care provider.    · Take medicines only as  directed by your health care provider. Follow the directions carefully. Blood pressure medicines must be taken as prescribed. The medicine does not work as well when you skip doses. Skipping doses also puts you at risk for problems.  · Do not smoke.    · Monitor your blood pressure at home as directed by your health care provider.   SEEK MEDICAL CARE IF:   · You think you are having a reaction to medicines taken.  · You have recurrent headaches or feel dizzy.  · You have swelling in your ankles.  · You have trouble with your vision.  SEEK IMMEDIATE MEDICAL CARE IF:  · You develop a severe headache or confusion.  · You have unusual weakness, numbness, or feel faint.  · You have severe chest or abdominal pain.  · You vomit repeatedly.  · You have trouble breathing.  MAKE SURE YOU:   · Understand these instructions.  · Will watch your condition.  · Will get help right away if you are not doing well or get worse.     This information is not intended to replace advice given to you by your health care provider. Make sure you discuss any questions you have with your health care provider.     Document Released: 12/18/2006 Document Revised: 05/03/2016 Document Reviewed: 10/10/2014  [employer] Patient Education ©2016 [employer]

## 2018-01-01 ENCOUNTER — HOSPITAL ENCOUNTER (EMERGENCY)
Facility: HOSPITAL | Age: 63
End: 2018-02-09
Attending: EMERGENCY MEDICINE | Admitting: EMERGENCY MEDICINE

## 2018-01-01 DIAGNOSIS — I46.9 CARDIAC ARREST (HCC): Primary | ICD-10-CM

## 2018-01-01 PROCEDURE — 92950 HEART/LUNG RESUSCITATION CPR: CPT | Performed by: EMERGENCY MEDICINE

## 2018-01-01 PROCEDURE — 25010000002 EPINEPHRINE PF 1 MG/10ML SOLUTION PREFILLED SYRINGE: Performed by: EMERGENCY MEDICINE

## 2018-01-01 PROCEDURE — 92950 HEART/LUNG RESUSCITATION CPR: CPT

## 2018-01-01 PROCEDURE — 94799 UNLISTED PULMONARY SVC/PX: CPT

## 2018-01-01 PROCEDURE — 99284 EMERGENCY DEPT VISIT MOD MDM: CPT

## 2018-01-01 PROCEDURE — 99282 EMERGENCY DEPT VISIT SF MDM: CPT | Performed by: EMERGENCY MEDICINE

## 2018-01-01 RX ADMIN — EPINEPHRINE 1 MG: 0.1 INJECTION, SOLUTION ENDOTRACHEAL; INTRACARDIAC; INTRAVENOUS at 08:14

## 2018-01-01 RX ADMIN — SODIUM BICARBONATE 50 MEQ: 84 INJECTION, SOLUTION INTRAVENOUS at 08:17

## 2018-01-01 RX ADMIN — EPINEPHRINE 1 MG: 0.1 INJECTION, SOLUTION ENDOTRACHEAL; INTRACARDIAC; INTRAVENOUS at 08:16

## 2018-01-01 RX ADMIN — EPINEPHRINE 1 MG: 0.1 INJECTION, SOLUTION ENDOTRACHEAL; INTRACARDIAC; INTRAVENOUS at 08:19

## 2018-01-01 RX ADMIN — EPINEPHRINE 1 MG: 0.1 INJECTION, SOLUTION ENDOTRACHEAL; INTRACARDIAC; INTRAVENOUS at 12:54

## 2018-02-09 NOTE — ED PROVIDER NOTES
Subjective   History of Present Illness  History of Present Illness    Chief complaint: Cardiac arrest    Location: None    Quality/Severity:  Severe    Timing/Duration: Unknown onset    Modifying Factors: None    Narrative: [facility].  Apparently he was found down this morning and morning rounds.  It is unknown how long he had been unresponsive before he was found.  CPR was initiated at 7:55 AM.  He was given 1 shock by EMS providers before arrival.  He has not received any medications prior to arrival.  The remainder of the history is entirely unknown due to patient's critical condition.    Associated Symptoms: As above    Review of Systems   Unable to perform ROS: Acuity of condition   All other systems reviewed and are negative.      Past Medical History:   Diagnosis Date   • COPD (chronic obstructive pulmonary disease)    • Coronary artery disease     anterior STEMI 1/2016 ([facility]), wotj 100% LM s/p 3.5x23 and 3x12 X[place] to the mid-distal LM and the distal LM to LAD; 40-50% prox LAD, 30% D1, 20% mid LCx, 20-30% diffuse RCA   • Hyperlipidemia    • Hypertension    • ICD (implantable cardioverter-defibrillator) in place     [employer] single lead, 2016   • Ischemic cardiomyopathy     EF 25% 1/2016, with anterior akinesis       Allergies   Allergen Reactions   • Azithromycin Angioedema   • Contrast Dye Other (See Comments)       Past Surgical History:   Procedure Laterality Date   • CARDIAC DEFIBRILLATOR PLACEMENT     • CORONARY ARTERY BYPASS GRAFT         No family history on file.    Social History     Social History   • Marital status: Unknown     Spouse name: N/A   • Number of children: N/A   • Years of education: N/A     Social History Main Topics   • Smoking status: Former Smoker     Packs/day: 2.00     Years: 55.00   • Smokeless tobacco: None   • Alcohol use No   • Drug use: No   • Sexual activity: No     Other Topics  Concern   • None     Social History Narrative       ED Triage Vitals   Temp Pulse Resp BP SpO2   -- -- -- -- --             Temp src Heart Rate Source Patient Position BP Location FiO2 (%)   -- -- -- -- --                Objective   Physical Exam   Constitutional: He appears well-developed and well-nourished. He appears distressed.   HENT:   Head: Normocephalic and atraumatic.   Eyes:   Pupils fixed and dilated bilaterally   Neck: No tracheal deviation present.   Cardiovascular:   No palpable pulse   Pulmonary/Chest:   No spontaneous respirations   Abdominal: Soft.   Musculoskeletal: Normal range of motion. He exhibits no edema or deformity.   Neurological:   Unresponsive, GCS of 3   Skin:   Mild lividity notable along the backside of torso   Nursing note and vitals reviewed.      Procedures         ED Course  ED Course   Comment By Time   This patient arrived in cardiac arrest from the local assisted.  He was found down in his assisted cell.  Unknown onset of arrest.  CPR was started at 7:55 AM.  He received one shot prior to arrival.  He had not been intubated nor had he received any ACLS medications prior to arrival.  We continued CPR on him immediately once he arrived in our bed.  We were ventilating him through the bag valve mask ventilations adequately.  His rhythm was asystole every time we stopped compressions to check for pulse.  He never did regain spontaneous circulation.  We conduct and multiple rounds of epinephrine and one dose of bicarbonate.  After several more minutes of high quality CPR without any response, I evaluated his chest with a bedside ultrasound probe.  This revealed no hint of cardiac activity at all.  Given the very poor prognosis and the unknown time of onset with some early signs of lividity on his back, I called the code at 8:22 AM and we stopped the resuscitation efforts. Rajat Orozco MD 02/09 [id number]                  [facility]  Number of Diagnoses or Management Options  Cardiac arrest:    Risk of Complications, Morbidity, and/or Mortality  Presenting problems: high  Diagnostic procedures: low  Management options: high        Final diagnoses:   Cardiac arrest            Rajat Orozco MD  02/09/18 0800